# Patient Record
Sex: FEMALE | Race: WHITE | NOT HISPANIC OR LATINO | Employment: FULL TIME | URBAN - METROPOLITAN AREA
[De-identification: names, ages, dates, MRNs, and addresses within clinical notes are randomized per-mention and may not be internally consistent; named-entity substitution may affect disease eponyms.]

---

## 2021-03-28 ENCOUNTER — HOSPITAL ENCOUNTER (INPATIENT)
Facility: HOSPITAL | Age: 56
LOS: 6 days | Discharge: HOME | DRG: 177 | End: 2021-04-03
Attending: EMERGENCY MEDICINE | Admitting: HOSPITALIST
Payer: COMMERCIAL

## 2021-03-28 ENCOUNTER — APPOINTMENT (EMERGENCY)
Dept: RADIOLOGY | Facility: HOSPITAL | Age: 56
DRG: 177 | End: 2021-03-28
Attending: EMERGENCY MEDICINE
Payer: COMMERCIAL

## 2021-03-28 DIAGNOSIS — R09.02 HYPOXIA: Primary | ICD-10-CM

## 2021-03-28 DIAGNOSIS — J18.9 PNEUMONIA OF LEFT LOWER LOBE DUE TO INFECTIOUS ORGANISM: ICD-10-CM

## 2021-03-28 DIAGNOSIS — J12.82 2019 NOVEL CORONAVIRUS-INFECTED PNEUMONIA (NCIP): ICD-10-CM

## 2021-03-28 DIAGNOSIS — U07.1 2019 NOVEL CORONAVIRUS-INFECTED PNEUMONIA (NCIP): ICD-10-CM

## 2021-03-28 PROBLEM — E11.9 TYPE 2 DIABETES MELLITUS WITHOUT COMPLICATION, WITHOUT LONG-TERM CURRENT USE OF INSULIN (CMS/HCC): Status: ACTIVE | Noted: 2021-03-28

## 2021-03-28 PROBLEM — J96.01 ACUTE RESPIRATORY FAILURE WITH HYPOXIA (CMS/HCC): Status: ACTIVE | Noted: 2021-03-28

## 2021-03-28 PROBLEM — I10 BENIGN ESSENTIAL HTN: Status: ACTIVE | Noted: 2021-03-28

## 2021-03-28 PROBLEM — E78.5 HYPERLIPIDEMIA: Status: ACTIVE | Noted: 2021-03-28

## 2021-03-28 LAB
ALBUMIN SERPL-MCNC: 3.6 G/DL (ref 3.4–5)
ALP SERPL-CCNC: 91 IU/L (ref 35–126)
ALT SERPL-CCNC: 44 IU/L (ref 11–54)
ANION GAP SERPL CALC-SCNC: 11 MEQ/L (ref 3–15)
AST SERPL-CCNC: 54 IU/L (ref 15–41)
BACTERIA URNS QL MICRO: ABNORMAL /HPF
BASOPHILS # BLD: 0 K/UL (ref 0.01–0.1)
BASOPHILS NFR BLD: 0 %
BILIRUB SERPL-MCNC: 0.7 MG/DL (ref 0.3–1.2)
BILIRUB UR QL STRIP.AUTO: NEGATIVE MG/DL
BUN SERPL-MCNC: 9 MG/DL (ref 8–20)
CALCIUM SERPL-MCNC: 8.1 MG/DL (ref 8.9–10.3)
CHLORIDE SERPL-SCNC: 101 MEQ/L (ref 98–109)
CLARITY UR REFRACT.AUTO: CLEAR
CO2 SERPL-SCNC: 23 MEQ/L (ref 22–32)
COLOR UR AUTO: YELLOW
CREAT SERPL-MCNC: 0.6 MG/DL (ref 0.6–1.1)
CRP SERPL-MCNC: 48.1 MG/L
DIFFERENTIAL METHOD BLD: ABNORMAL
EOSINOPHIL # BLD: 0 K/UL (ref 0.04–0.36)
EOSINOPHIL NFR BLD: 0 %
ERYTHROCYTE [DISTWIDTH] IN BLOOD BY AUTOMATED COUNT: 13.7 % (ref 11.7–14.4)
FLUAV RNA SPEC QL NAA+PROBE: NEGATIVE
FLUBV RNA SPEC QL NAA+PROBE: NEGATIVE
GFR SERPL CREATININE-BSD FRML MDRD: >60 ML/MIN/1.73M*2
GLUCOSE BLD-MCNC: 112 MG/DL (ref 70–99)
GLUCOSE BLD-MCNC: 207 MG/DL (ref 70–99)
GLUCOSE SERPL-MCNC: 124 MG/DL (ref 70–99)
GLUCOSE UR STRIP.AUTO-MCNC: NEGATIVE MG/DL
HCT VFR BLDCO AUTO: 37.7 % (ref 35–45)
HGB BLD-MCNC: 13.1 G/DL (ref 11.8–15.7)
HGB UR QL STRIP.AUTO: NEGATIVE
HYALINE CASTS #/AREA URNS LPF: ABNORMAL /LPF
IMM GRANULOCYTES # BLD AUTO: 0.02 K/UL (ref 0–0.08)
IMM GRANULOCYTES NFR BLD AUTO: 0.5 %
KETONES UR STRIP.AUTO-MCNC: 1 MG/DL
LDH SERPL L TO P-CCNC: 313 IU/L (ref 98–192)
LEUKOCYTE ESTERASE UR QL STRIP.AUTO: NEGATIVE
LYMPHOCYTES # BLD: 0.85 K/UL (ref 1.2–3.5)
LYMPHOCYTES NFR BLD: 20.5 %
MAGNESIUM SERPL-MCNC: 1.6 MG/DL (ref 1.8–2.5)
MCH RBC QN AUTO: 30.1 PG (ref 28–33.2)
MCHC RBC AUTO-ENTMCNC: 34.7 G/DL (ref 32.2–35.5)
MCV RBC AUTO: 86.7 FL (ref 83–98)
MONOCYTES # BLD: 0.4 K/UL (ref 0.28–0.8)
MONOCYTES NFR BLD: 9.6 %
NEUTROPHILS # BLD: 2.88 K/UL (ref 1.7–7)
NEUTS SEG NFR BLD: 69.4 %
NITRITE UR QL STRIP.AUTO: NEGATIVE
NRBC BLD-RTO: 0 %
PDW BLD AUTO: 10.9 FL (ref 9.4–12.3)
PH UR STRIP.AUTO: 6.5 [PH]
PLATELET # BLD AUTO: 194 K/UL (ref 150–369)
POCT TEST: ABNORMAL
POCT TEST: ABNORMAL
POTASSIUM SERPL-SCNC: 3.3 MEQ/L (ref 3.6–5.1)
PROT SERPL-MCNC: 7.3 G/DL (ref 6–8.2)
PROT UR QL STRIP.AUTO: ABNORMAL
RBC # BLD AUTO: 4.35 M/UL (ref 3.93–5.22)
RBC #/AREA URNS HPF: ABNORMAL /HPF
RSV RNA SPEC QL NAA+PROBE: NEGATIVE
SARS-COV-2 RNA RESP QL NAA+PROBE: POSITIVE
SODIUM SERPL-SCNC: 135 MEQ/L (ref 136–144)
SP GR UR REFRACT.AUTO: 1.02
SQUAMOUS URNS QL MICRO: 1 /HPF
UROBILINOGEN UR STRIP-ACNC: 0.2 EU/DL
WBC # BLD AUTO: 4.15 K/UL (ref 3.8–10.5)
WBC #/AREA URNS HPF: ABNORMAL /HPF

## 2021-03-28 PROCEDURE — 63700000 HC SELF-ADMINISTRABLE DRUG: Performed by: PHYSICIAN ASSISTANT

## 2021-03-28 PROCEDURE — 81001 URINALYSIS AUTO W/SCOPE: CPT | Performed by: PHYSICIAN ASSISTANT

## 2021-03-28 PROCEDURE — 96374 THER/PROPH/DIAG INJ IV PUSH: CPT

## 2021-03-28 PROCEDURE — 86140 C-REACTIVE PROTEIN: CPT | Performed by: HOSPITALIST

## 2021-03-28 PROCEDURE — 87637 SARSCOV2&INF A&B&RSV AMP PRB: CPT | Performed by: PHYSICIAN ASSISTANT

## 2021-03-28 PROCEDURE — 8E0ZXY6 ISOLATION: ICD-10-PCS | Performed by: HOSPITALIST

## 2021-03-28 PROCEDURE — 80053 COMPREHEN METABOLIC PANEL: CPT | Performed by: PHYSICIAN ASSISTANT

## 2021-03-28 PROCEDURE — 99223 1ST HOSP IP/OBS HIGH 75: CPT | Mod: CS | Performed by: HOSPITALIST

## 2021-03-28 PROCEDURE — 83735 ASSAY OF MAGNESIUM: CPT | Performed by: HOSPITALIST

## 2021-03-28 PROCEDURE — 63600000 HC DRUGS/DETAIL CODE: Performed by: HOSPITALIST

## 2021-03-28 PROCEDURE — 20600000 HC ROOM AND CARE INTERMEDIATE/TELEMETRY

## 2021-03-28 PROCEDURE — 85025 COMPLETE CBC W/AUTO DIFF WBC: CPT | Performed by: PHYSICIAN ASSISTANT

## 2021-03-28 PROCEDURE — 83615 LACTATE (LD) (LDH) ENZYME: CPT | Performed by: HOSPITALIST

## 2021-03-28 PROCEDURE — 63700000 HC SELF-ADMINISTRABLE DRUG: Performed by: HOSPITALIST

## 2021-03-28 PROCEDURE — 99284 EMERGENCY DEPT VISIT MOD MDM: CPT | Mod: 25

## 2021-03-28 PROCEDURE — 93005 ELECTROCARDIOGRAM TRACING: CPT | Performed by: PHYSICIAN ASSISTANT

## 2021-03-28 PROCEDURE — 96375 TX/PRO/DX INJ NEW DRUG ADDON: CPT

## 2021-03-28 PROCEDURE — 36415 COLL VENOUS BLD VENIPUNCTURE: CPT | Performed by: PHYSICIAN ASSISTANT

## 2021-03-28 PROCEDURE — 25800000 HC PHARMACY IV SOLUTIONS: Performed by: PHYSICIAN ASSISTANT

## 2021-03-28 PROCEDURE — 71045 X-RAY EXAM CHEST 1 VIEW: CPT

## 2021-03-28 PROCEDURE — 63600000 HC DRUGS/DETAIL CODE: Performed by: PHYSICIAN ASSISTANT

## 2021-03-28 RX ORDER — POTASSIUM CHLORIDE 20 MEQ/1
20 TABLET, EXTENDED RELEASE ORAL ONCE
Status: COMPLETED | OUTPATIENT
Start: 2021-03-28 | End: 2021-03-28

## 2021-03-28 RX ORDER — ALBUTEROL SULFATE 90 UG/1
2 INHALANT RESPIRATORY (INHALATION) EVERY 6 HOURS PRN
Status: DISCONTINUED | OUTPATIENT
Start: 2021-03-28 | End: 2021-03-28 | Stop reason: SDUPTHER

## 2021-03-28 RX ORDER — METOPROLOL SUCCINATE 50 MG/1
50 TABLET, EXTENDED RELEASE ORAL DAILY
Status: DISCONTINUED | OUTPATIENT
Start: 2021-03-28 | End: 2021-04-03 | Stop reason: HOSPADM

## 2021-03-28 RX ORDER — DEXTROSE 40 %
15-30 GEL (GRAM) ORAL AS NEEDED
Status: DISCONTINUED | OUTPATIENT
Start: 2021-03-28 | End: 2021-04-03 | Stop reason: HOSPADM

## 2021-03-28 RX ORDER — DEXTROSE 50 % IN WATER (D50W) INTRAVENOUS SYRINGE
25 AS NEEDED
Status: DISCONTINUED | OUTPATIENT
Start: 2021-03-28 | End: 2021-03-28 | Stop reason: SDUPTHER

## 2021-03-28 RX ORDER — ENOXAPARIN SODIUM 100 MG/ML
40 INJECTION SUBCUTANEOUS
Status: DISCONTINUED | OUTPATIENT
Start: 2021-03-28 | End: 2021-04-03 | Stop reason: HOSPADM

## 2021-03-28 RX ORDER — BENZONATATE 100 MG/1
200 CAPSULE ORAL 3 TIMES DAILY PRN
Status: DISCONTINUED | OUTPATIENT
Start: 2021-03-28 | End: 2021-04-03 | Stop reason: HOSPADM

## 2021-03-28 RX ORDER — DEXAMETHASONE SODIUM PHOSPHATE 4 MG/ML
6 INJECTION, SOLUTION INTRA-ARTICULAR; INTRALESIONAL; INTRAMUSCULAR; INTRAVENOUS; SOFT TISSUE ONCE
Status: COMPLETED | OUTPATIENT
Start: 2021-03-28 | End: 2021-03-28

## 2021-03-28 RX ORDER — SODIUM CHLORIDE 9 MG/ML
125 INJECTION, SOLUTION INTRAVENOUS CONTINUOUS
Status: DISCONTINUED | OUTPATIENT
Start: 2021-03-28 | End: 2021-03-28

## 2021-03-28 RX ORDER — ALBUTEROL SULFATE 90 UG/1
4 INHALANT RESPIRATORY (INHALATION) ONCE
Status: COMPLETED | OUTPATIENT
Start: 2021-03-28 | End: 2021-03-28

## 2021-03-28 RX ORDER — ONDANSETRON HYDROCHLORIDE 2 MG/ML
4 INJECTION, SOLUTION INTRAVENOUS ONCE
Status: COMPLETED | OUTPATIENT
Start: 2021-03-28 | End: 2021-03-28

## 2021-03-28 RX ORDER — DEXAMETHASONE SODIUM PHOSPHATE 4 MG/ML
6 INJECTION, SOLUTION INTRA-ARTICULAR; INTRALESIONAL; INTRAMUSCULAR; INTRAVENOUS; SOFT TISSUE
Status: DISCONTINUED | OUTPATIENT
Start: 2021-03-29 | End: 2021-04-03 | Stop reason: HOSPADM

## 2021-03-28 RX ORDER — IBUPROFEN 200 MG
16-32 TABLET ORAL AS NEEDED
Status: DISCONTINUED | OUTPATIENT
Start: 2021-03-28 | End: 2021-04-03 | Stop reason: HOSPADM

## 2021-03-28 RX ORDER — DEXTROSE 40 %
15-30 GEL (GRAM) ORAL AS NEEDED
Status: DISCONTINUED | OUTPATIENT
Start: 2021-03-28 | End: 2021-03-28 | Stop reason: SDUPTHER

## 2021-03-28 RX ORDER — IBUPROFEN 200 MG
16-32 TABLET ORAL AS NEEDED
Status: DISCONTINUED | OUTPATIENT
Start: 2021-03-28 | End: 2021-03-28 | Stop reason: SDUPTHER

## 2021-03-28 RX ORDER — ALBUTEROL SULFATE 90 UG/1
2 INHALANT RESPIRATORY (INHALATION) EVERY 6 HOURS PRN
Status: DISCONTINUED | OUTPATIENT
Start: 2021-03-28 | End: 2021-04-03 | Stop reason: HOSPADM

## 2021-03-28 RX ORDER — INSULIN ASPART 100 [IU]/ML
3-9 INJECTION, SOLUTION INTRAVENOUS; SUBCUTANEOUS
Status: DISCONTINUED | OUTPATIENT
Start: 2021-03-28 | End: 2021-04-03 | Stop reason: HOSPADM

## 2021-03-28 RX ORDER — ACETAMINOPHEN 325 MG/1
650 TABLET ORAL ONCE
Status: COMPLETED | OUTPATIENT
Start: 2021-03-28 | End: 2021-03-28

## 2021-03-28 RX ORDER — DEXTROSE 50 % IN WATER (D50W) INTRAVENOUS SYRINGE
25 AS NEEDED
Status: DISCONTINUED | OUTPATIENT
Start: 2021-03-28 | End: 2021-04-03 | Stop reason: HOSPADM

## 2021-03-28 RX ADMIN — DEXAMETHASONE SODIUM PHOSPHATE 6 MG: 4 INJECTION, SOLUTION INTRA-ARTICULAR; INTRALESIONAL; INTRAMUSCULAR; INTRAVENOUS; SOFT TISSUE at 10:07

## 2021-03-28 RX ADMIN — BENZONATATE 200 MG: 100 CAPSULE ORAL at 22:25

## 2021-03-28 RX ADMIN — METOPROLOL SUCCINATE 50 MG: 50 TABLET, EXTENDED RELEASE ORAL at 17:20

## 2021-03-28 RX ADMIN — ACETAMINOPHEN 650 MG: 325 TABLET ORAL at 09:55

## 2021-03-28 RX ADMIN — INSULIN ASPART 3 UNITS: 100 INJECTION, SOLUTION INTRAVENOUS; SUBCUTANEOUS at 17:28

## 2021-03-28 RX ADMIN — ALBUTEROL SULFATE 4 PUFF: 90 AEROSOL, METERED RESPIRATORY (INHALATION) at 09:55

## 2021-03-28 RX ADMIN — POTASSIUM CHLORIDE 20 MEQ: 1500 TABLET, EXTENDED RELEASE ORAL at 11:42

## 2021-03-28 RX ADMIN — SODIUM CHLORIDE 125 ML/HR: 9 INJECTION, SOLUTION INTRAVENOUS at 10:08

## 2021-03-28 RX ADMIN — ENOXAPARIN SODIUM 40 MG: 40 INJECTION SUBCUTANEOUS at 17:20

## 2021-03-28 RX ADMIN — ONDANSETRON 4 MG: 2 INJECTION INTRAMUSCULAR; INTRAVENOUS at 10:07

## 2021-03-28 SDOH — HEALTH STABILITY: MENTAL HEALTH: HOW OFTEN DO YOU HAVE A DRINK CONTAINING ALCOHOL?: NEVER

## 2021-03-28 ASSESSMENT — ENCOUNTER SYMPTOMS
COLOR CHANGE: 0
TROUBLE SWALLOWING: 0
COUGH: 1
MYALGIAS: 1
CHEST TIGHTNESS: 1
HEMATURIA: 0
DIZZINESS: 0
NAUSEA: 1
FLANK PAIN: 0
VOICE CHANGE: 0
NECK PAIN: 0
ABDOMINAL PAIN: 0
TREMORS: 0
CONSTIPATION: 0
ARTHRALGIAS: 0
DIARRHEA: 1
CONFUSION: 0
FREQUENCY: 0
NUMBNESS: 0
DIFFICULTY URINATING: 0
HEADACHES: 0
DECREASED CONCENTRATION: 0
STRIDOR: 0
FATIGUE: 1
SINUS PAIN: 0
DYSURIA: 0
BACK PAIN: 0
PHOTOPHOBIA: 0
RHINORRHEA: 0
SHORTNESS OF BREATH: 1
BRUISES/BLEEDS EASILY: 0
SORE THROAT: 0
LIGHT-HEADEDNESS: 0
DIAPHORESIS: 0
CHILLS: 0
WHEEZING: 0
JOINT SWELLING: 0
PALPITATIONS: 0
VOMITING: 1
WEAKNESS: 1
SINUS PRESSURE: 0
FEVER: 1
NECK STIFFNESS: 0

## 2021-03-28 NOTE — ED PROVIDER NOTES
"Emergency Medicine Note  HPI   HISTORY OF PRESENT ILLNESS     Patient is a 57 y/o female with PMH HTN, HLD, DM type II (NIDDM) who presents c/o COVID symptoms x 8 days  States symptoms began on 3/20 with fever  States she was tested on 3/21 with a self administered test at [facility] and it was negative   States over the course of the week she has progressively worsened   Notes she did call her PCP, they did a telemedicine visit and rx z-pack which she began on 3/25, states she has 1 pill left  Reports cough, congestion, fever, bodyaches, n/v/d   States cough is dry, constant - notes associated SOB   States TNTC diarrhea - without blood or mucus   States emesis occasionally is with mucus - denies blood   States last evening \"I just couldn't take it anymore\" which prompted her arrival to ED this morning   Denies chest pain, abdominal pain   Denies  symptoms  Denies any other complaints or concerns at this time             Patient History   PAST HISTORY     Reviewed from Nursing Triage:      Past Medical History:   Diagnosis Date   • Hypertension    • Lipid disorder    • Type 2 diabetes mellitus (CMS/HCC)        History reviewed. No pertinent surgical history.    History reviewed. No pertinent family history.    Social History     Tobacco Use   • Smoking status: Never Smoker   • Smokeless tobacco: Never Used   Substance Use Topics   • Alcohol use: Never     Frequency: Never   • Drug use: Never         Review of Systems   REVIEW OF SYSTEMS     Review of Systems   Constitutional: Positive for fatigue and fever. Negative for chills and diaphoresis.   HENT: Positive for congestion. Negative for ear pain, postnasal drip, rhinorrhea, sinus pressure, sinus pain, sore throat, tinnitus, trouble swallowing and voice change.    Eyes: Negative for photophobia and visual disturbance.   Respiratory: Positive for cough, chest tightness and shortness of breath. Negative for wheezing and stridor.    Cardiovascular: Negative for chest pain, " palpitations and leg swelling.   Gastrointestinal: Positive for diarrhea, nausea and vomiting. Negative for abdominal pain and constipation.   Genitourinary: Negative for decreased urine volume, difficulty urinating, dysuria, flank pain, frequency, hematuria, pelvic pain and urgency.   Musculoskeletal: Positive for myalgias. Negative for arthralgias, back pain, gait problem, joint swelling, neck pain and neck stiffness.   Skin: Negative for color change and rash.   Neurological: Positive for weakness. Negative for dizziness, tremors, syncope, light-headedness, numbness and headaches.   Hematological: Does not bruise/bleed easily.   Psychiatric/Behavioral: Negative for confusion and decreased concentration.         VITALS     ED Vitals    Date/Time Temp Pulse Resp BP SpO2 [facility]   03/28/21 1101 -- -- 22 136/67 95 % SFC   03/28/21 0924 37.5 °C (99.5 °F) -- 22 141/78 87 % CFP        Pulse Ox %: 87 % (03/28/21 0955)  Pulse Ox Interpretation: Low (03/28/21 0955)           Physical Exam   PHYSICAL EXAM     Physical Exam  Vitals signs and nursing note reviewed.   Constitutional:       General: She is not in acute distress.     Appearance: Normal appearance. She is ill-appearing. She is not toxic-appearing or diaphoretic.   HENT:      Head: Normocephalic and atraumatic.   Eyes:      Extraocular Movements: Extraocular movements intact.      Conjunctiva/sclera: Conjunctivae normal.      Pupils: Pupils are equal, round, and reactive to light.   Neck:      Musculoskeletal: Normal range of motion and neck supple.   Cardiovascular:      Rate and Rhythm: Normal rate and regular rhythm.      Pulses: Normal pulses.      Heart sounds: Normal heart sounds.   Pulmonary:      Effort: Pulmonary effort is normal. No respiratory distress.      Breath sounds: Normal breath sounds. No stridor. No wheezing.   Chest:      Chest wall: No tenderness.   Abdominal:      General: Bowel sounds are normal. There is no distension.      Palpations:  Abdomen is soft.      Tenderness: There is no abdominal tenderness. There is no guarding or rebound.   Musculoskeletal: Normal range of motion.   Skin:     General: Skin is warm and dry.      Capillary Refill: Capillary refill takes less than 2 seconds.   Neurological:      General: No focal deficit present.      Mental Status: She is alert and oriented to person, place, and time.   Psychiatric:         Mood and Affect: Mood normal.         Thought Content: Thought content normal.         Judgment: Judgment normal.           PROCEDURES     Procedures     DATA     Results     Procedure Component Value Units Date/Time    UA with reflex culture [[id number]] Collected: 03/28/21 1102    Specimen: Urine, Clean Catch Updated: 03/28/21 1107    Narrative:      The following orders were created for panel order UA with reflex culture.  Procedure                               Abnormality         Status                     ---------                               -----------         ------                     UA Reflex to Culture (Ma...[[id number]]                      In process                   Please view results for these tests on the individual orders.    UA Reflex to Culture (Macroscopic) [[id number]] Collected: 03/28/21 1102    Specimen: Urine, Clean Catch Updated: 03/28/21 1107    SARS-CoV-2 (COVID-19), PCR Nasopharynx [[id number]]  (Abnormal) Collected: 03/28/21 1005    Specimen: Nasopharyngeal Swab from Nasopharynx Updated: 03/28/21 1104    Narrative:      The following orders were created for panel order SARS-CoV-2 (COVID-19), PCR Nasopharynx.  Procedure                               Abnormality         Status                     ---------                               -----------         ------                     SARS-COV-2 (COVID-19)/ F...[[id number]]  Abnormal            Final result                 Please view results for these tests on the individual orders.    SARS-COV-2 (COVID-19)/ FLU A/B, AND RSV, PCR Nasopharynx  [[id number]]  (Abnormal) Collected: 03/28/21 1005    Specimen: Nasopharyngeal Swab from Nasopharynx Updated: 03/28/21 1104     SARS-CoV-2 (COVID-19) Positive     Influenza A Negative     Influenza B Negative     Respiratory Syncytial Virus Negative    Comprehensive metabolic panel [[id number]]  (Abnormal) Collected: 03/28/21 1005    Specimen: Blood, Venous Updated: 03/28/21 1043     Sodium 135 mEQ/L      Potassium 3.3 mEQ/L      Comment: Results obtained on plasma. Plasma Potassium values may be up to 0.4 mEQ/L less than serum values. The differences may be greater for patients with high platelet or white cell counts.        Chloride 101 mEQ/L      CO2 23 mEQ/L      BUN 9 mg/dL      Creatinine 0.6 mg/dL      Glucose 124 mg/dL      Calcium 8.1 mg/dL      AST (SGOT) 54 IU/L      ALT (SGPT) 44 IU/L      Alkaline Phosphatase 91 IU/L      Total Protein 7.3 g/dL      Comment: Test performed on plasma which typically contains approximately 0.4 g/dL more protein than serum.        Albumin 3.6 g/dL      Bilirubin, Total 0.7 mg/dL      eGFR >60.0 mL/min/1.73m*2      Anion Gap 11 mEQ/L     CBC and differential [[id number]]  (Abnormal) Collected: 03/28/21 1005    Specimen: Blood, Venous Updated: 03/28/21 1020     WBC 4.15 K/uL      RBC 4.35 M/uL      Hemoglobin 13.1 g/dL      Hematocrit 37.7 %      MCV 86.7 fL      MCH 30.1 pg      MCHC 34.7 g/dL      RDW 13.7 %      Platelets 194 K/uL      MPV 10.9 fL      Differential Type Auto     nRBC 0.0 %      Immature Granulocytes 0.5 %      Neutrophils 69.4 %      Lymphocytes 20.5 %      Monocytes 9.6 %      Eosinophils 0.0 %      Basophils 0.0 %      Immature Granulocytes, Absolute 0.02 K/uL      Neutrophils, Absolute 2.88 K/uL      Lymphocytes, Absolute 0.85 K/uL      Monocytes, Absolute 0.40 K/uL      Eosinophils, Absolute 0.00 K/uL      Basophils, Absolute 0.00 K/uL           Imaging Results          X-RAY CHEST 1 VIEW (Final result)  Result time 03/28/21 10:17:44    Final result                  Impression:    IMPRESSION:    The lungs are hypoinflated.  The superior mediastinum and cardiac silhouette are  magnified due to technique.  There is consolidative opacity within the mid and  left lower lung with air bronchograms, in keeping with a pneumonia.  No  pneumothorax or vascular congestion.  PA and lateral radiographs are recommended  in 6-8 weeks to document resolution of imaging findings.             Narrative:      CLINICAL HISTORY: Fever    COMMENT: Single view chest                                ECG 12 lead    (Results Pending)       Scoring tools                               ED Course & MDM   MDM / ED COURSE and CLINICAL IMPRESSIONS     MDM    ED Course as of Mar 28 1122   Sun Mar 28, 2021   1104 SARS-CoV-2 (COVID-19)(!!): Positive [CL]   1105 CXR reviewed     [CL]   1105 Plan to admit, COVID PNA and hypoxia    [CL]   1111 [place] [facility] [place]   1111 Patient agreeable with plan for admission     [CL]   1121 Case was discussed with hospitalist.  Reviewed patient's presentation, ED course, and relevant data.  Hospitalist accepts patient on their service and will see / admit pt.    [CL]      ED Course User Index  [CL] Buffy Maldonado PA C         Clinical Impressions as of Mar 28 1122   Hypoxia   Pneumonia of left lower lobe due to infectious organism   2019 novel coronavirus-infected pneumonia ([facility])            Buffy Maldonado PA C  03/28/21 1122

## 2021-03-28 NOTE — ASSESSMENT & PLAN NOTE
+ COVID testing with some imaging changes on CXR  Required 4L nc now improved to RA  Decadron started in the ED  ID following; completed remdesivir  Continue supportive care  Wean O2 as tolerated - passed home O2 evaluation.   Much improved clinically.  Ms. Lane feels much better. She is ambulating and talkative and is in good spirits. Afebrile.   Oxygenation stable on RA ambulation.  She would like to be discharged home. She has a pulse ox at home and will continue to monitor herself closely and will follow up with her PCP.

## 2021-03-28 NOTE — PLAN OF CARE
Problem: Adult Inpatient Plan of Care  Goal: Plan of Care Review  Outcome: Progressing  Flowsheets (Taken 3/28/2021 0347)  Plan of Care Reviewed With: patient  Outcome Summary: discussed the plan of care  Goal: Readiness for Transition of Care  Intervention: Mutually Develop Transition Plan  Flowsheets (Taken 3/28/2021 4750)  Anticipated Discharge Disposition: home without services  Equipment Needed After Discharge: none  Discharge Coordination/Progress:   Resides in a private home with spouse  Independent and ambulatory  PCP is Suzie Lewis           Assistive Device/Animal Currently Used at Home: none  Anticipated Changes Related to Illness: none  Transportation Concerns: car, none  Readmission Within the Last 30 Days: no previous admission in last 30 days  Patient/Family Anticipated Services at Transition: none  Patient/Family Anticipates Transition to: home with family  Transportation Anticipated: family or friend will provide  Concerns to be Addressed:   discharge planning   no discharge needs identified   denies needs/concerns at this time  Patient's Choice of Community Agency(s): N/A  Offered/Gave Vendor List: no  Explained the role of the CC team, will follow and assist as necessary.

## 2021-03-28 NOTE — ED ATTESTATION NOTE
The patient was evaluated and managed by the physician assistant / nurse practitioner.     Kenzie Wakler DO  03/28/21 1147

## 2021-03-28 NOTE — H&P
Hospital Medicine Service -  History & Physical        CHIEF COMPLAINT   Shortness of breath     HISTORY OF PRESENT ILLNESS      Telma Lane is a 56 y.o. female with a past medical history of HTN, HLD, DM2 who presents with fever and shortness of breath. Patient developed fever 3/21/21 of 101 at home. She took a self-administered [facility] test the next day and it was negative.  She continued to have fever, non-productive cough, malaise, body aches and had some GI symptoms - with diarrhea during that time. No nausea or vomiting. Saw PCP on virtual visit 3/25 and was given azithromycin. Has almost completed the course of antibiotics but has not felt better. She exerted herself yesterday by helping her daughter move. Felt extremely tired this morning with shortness of breath and cough. She subsequently presented to the ER for evaluation.    Was 87% on RA in the ER.  [place] better after steroids and oxygen.    Patient does work as a school lunch .    PAST MEDICAL AND SURGICAL HISTORY      Past Medical History:   Diagnosis Date   • Hypertension    • Lipid disorder    • Type 2 diabetes mellitus (CMS/[facility])        History reviewed. No pertinent surgical history.    PCP: Suzie Burrell CRNP    MEDICATIONS      Prior to Admission medications    Not on File       ALLERGIES      Penicillins and Tetracycline    FAMILY HISTORY      History reviewed. No pertinent family history.    SOCIAL HISTORY      Social History     Socioeconomic History   • Marital status:      Spouse name: None   • Number of children: None   • Years of education: None   • Highest education level: None   Occupational History   • None   Social Needs   • Financial resource strain: None   • Food insecurity     Worry: None     Inability: None   • Transportation needs     Medical: None     Non-medical: None   Tobacco Use   • Smoking status: Never Smoker   • Smokeless tobacco: Never Used   Substance and Sexual Activity   • Alcohol use: Never      Frequency: Never   • Drug use: Never   • Sexual activity: None   Lifestyle   • Physical activity     Days per week: None     Minutes per session: None   • Stress: None   Relationships   • Social connections     Talks on phone: None     Gets together: None     Attends Congregational service: None     Active member of club or organization: None     Attends meetings of clubs or organizations: None     Relationship status: None   • Intimate partner violence     Fear of current or ex partner: None     Emotionally abused: None     Physically abused: None     Forced sexual activity: None   Other Topics Concern   • None   Social History Narrative   • None       REVIEW OF SYSTEMS      All other systems reviewed and negative except as noted in HPI    PHYSICAL EXAMINATION      Temp:  [37.4 °C (99.4 °F)-37.8 °C (100.1 °F)] 37.4 °C (99.4 °F)  Heart Rate:  [92] 92  Resp:  [18-22] 18  BP: (127-141)/(64-82) 132/82  Body mass index is 36.43 kg/m².    Physical Exam  Constitutional:       Appearance: She is well-developed. She is obese.   HENT:      Head: Normocephalic and atraumatic.   Eyes:      General: No scleral icterus.     Pupils: Pupils are equal, round, and reactive to light.   Neck:      Musculoskeletal: Normal range of motion and neck supple.      Vascular: No JVD.   Cardiovascular:      Rate and Rhythm: Normal rate.   Pulmonary:      Effort: Pulmonary effort is normal. No respiratory distress.      Breath sounds: No stridor. No wheezing or rales.      Comments: Diminished breath sounds at the bases  Chest:      Chest wall: No tenderness.   Abdominal:      General: Bowel sounds are normal. There is no distension.      Palpations: Abdomen is soft.      Tenderness: There is no abdominal tenderness. There is no guarding or rebound.   Musculoskeletal: Normal range of motion.         General: No tenderness or deformity.   Lymphadenopathy:      Cervical: No cervical adenopathy.   Skin:     General: Skin is warm and dry.    Neurological:      Mental Status: She is alert and oriented to person, place, and time.      Sensory: No sensory deficit.      Motor: No abnormal muscle tone.   Psychiatric:         Behavior: Behavior normal.         LABS / IMAGING / EKG        Labs  I have reviewed the patient's pertinent labs.  Significant abnormals are Cr 0.6, Cr 3.3, WBC 4.15, Hgb 13.1.    Imaging  CXR with hypoinflated lungs. Consolidation opacity with mid and LLL.    SARS-CoV-2 (COVID-19) (no units)   Date/Time Value   03/28/2021 1005 Positive (AA)       ECG/Telemetry  I have independently reviewed the telemetry. Significant findings include normal sinus.    ASSESSMENT AND PLAN           * Pneumonia due to COVID-19 virus  Assessment & Plan  + COVID testing with some imaging changes on CXR  Now with mild hypoxia requiring low dose oxygen supplementation  Got decadron in the ER, continue daily for now  ID to assess for remdesivir  Follow inflammatory markers    Acute respiratory failure with hypoxia (CMS/HCC)  Assessment & Plan  2/2 COVID PNA  Cont supplemental oxygen, wean as able  Home O2 eval prior to discharge    Type 2 diabetes mellitus without complication, without long-term current use of insulin (CMS/HCC)  Assessment & Plan  Usually on metformin at home  Hold while inpatient  Accucheck and SSI    Hyperlipidemia  Assessment & Plan  Atorvastatin    Benign essential HTN  Assessment & Plan  Usually on metoprolol, which she was not able to take this AM  Continue metoprolol and trend BPs  Titrate as needed       VTE Assessment: [place]    VTE Prophylaxis: Current anticoagulant orders:      enoxaparin (LOVENOX) syringe 40 mg  Daily (6p)     Code Status: Full Code  Estimated Discharge Date: 3/30/2021  Disposition Planning: pending clinical course     Jonna Quintana MD  3/28/2021

## 2021-03-28 NOTE — ASSESSMENT & PLAN NOTE
Usually on metformin at home  Hold while inpatient  Accucheck and SSI  Resume home med regimen on discharge.

## 2021-03-28 NOTE — CONSULTS
Infectious Disease Consult Note    Patient Name: Telma Lane  MR#: [id number]  : 1965  Admission Date: 3/28/2021  Consult Date: 21 12:15 PM   Consultant: Keysha Taylor MD    Reason for Consult: COVID pneumonia  Referring Provider:     History of Present Illness     Telma Lane is a 56 y.o. female who was admitted on 3/28/2021. For diarrhea, feeling unwell, weak.  She developed a fever last Saturday night and then went to [facility] the following  which was a week ago for a self-administered test which was negative but during the week she continued to have diarrhea, fever, weakness, had a televisit with her primary care physician who prescribed the Z-Jeff on 3/25/2021.  She states that her daughter bought a new house and she was helping her almost all day yesterday and thinks that she overdid it and felt extremely drained which prompted the arrival to the ER where she was noted to have a pulse ox of 87% on room air which improved to 95% with 2 L oxygen supplementation  Denies significant cough, mild in nature, clear white sputum  No nausea no vomiting  Last BM yesterday  Denies smoking  States that she has been awake modified proning at home including on her stomach as she had heard about that on TV in terms of the fact  Lives at home with her  and son and daughter.  Both her son and daughter are Covid vaccinated as they are in healthcare    Outside records were reviewed.    Allergies:   Allergies   Allergen Reactions   • Penicillins    • Tetracycline        Medical History:   Past Medical History:   Diagnosis Date   • Hypertension    • Lipid disorder    • Type 2 diabetes mellitus (CMS/[facility])        Surgical History: History reviewed. No pertinent surgical history.    Social History:   Social History     Socioeconomic History   • Marital status:      Spouse name: None   • Number of children: None   • Years of education: None   • Highest education level: None   Occupational  History   • None   Social Needs   • Financial resource strain: None   • Food insecurity     Worry: None     Inability: None   • Transportation needs     Medical: None     Non-medical: None   Tobacco Use   • Smoking status: Never Smoker   • Smokeless tobacco: Never Used   Substance and Sexual Activity   • Alcohol use: Never     Frequency: Never   • Drug use: Never   • Sexual activity: None   Lifestyle   • Physical activity     Days per week: None     Minutes per session: None   • Stress: None   Relationships   • Social connections     Talks on phone: None     Gets together: None     Attends [employer] service: None     Active member of club or organization: None     Attends meetings of clubs or organizations: None     Relationship status: None   • Intimate partner violence     Fear of current or ex partner: None     Emotionally abused: None     Physically abused: None     Forced sexual activity: None   Other Topics Concern   • None   Social History Narrative   • None          Travel Exposure:   Travel and Exposure Screening      Most Recent Value   Travel Screening   Overnight hospitalization outside the [place] in the last year?  No Filed On: 03/28/2021 0929   Exposure Screening   Symptoms          Animal Exposure: none    Other Exposure: none    Family History: History reviewed. No pertinent family history.    Review of Systems    Constitutional: positive for sweats, malaise, fatigue and chills  Eyes: negative for redness  Ears, nose, mouth, throat, and face: negative for sore throat  Respiratory: negative for asthma, chronic bronchitis, hemoptysis, pleurisy/chest pain and wheezing  Cardiovascular: negative for chest pressure/discomfort  Gastrointestinal: positive for diarrhea  Genitourinary:negative for dysuria  Integument/breast: negative for skin color change  Hematologic/lymphatic: negative for bleeding  Musculoskeletal:negative for myalgias  Neurological: negative for headaches    Medications:    Current IP Meds  "(From admission, onward)        Frequency     potassium chloride (KLOR-CON) tablet extended release 20 mEq  ([facility] ED MED QUICK LIST ELECTROLYTE REP)      Once     acetaminophen (TYLENOL) tablet 650 mg      Once     ondansetron (ZOFRAN) injection 4 mg      Once     sodium chloride 0.9 % infusion      Continuous     dexamethasone (DECADRON) injection 6 mg      Once     albuterol HFA (VENTOLIN HFA) 90 mcg/actuation inhaler 4 puff  (Albuterol MDI Subpanel)      Once                Objective     Vital Signs:    Patient Vitals for the past 72 hrs:   BP Temp Temp src Resp SpO2 Height Weight   21 1155 132/66 37.8 °C (100.1 °F) Oral 18 93 % -- --   21 1101 136/67 -- -- (!) 22 95 % -- --   21 0924 (!) 141/78 37.5 °C (99.5 °F) Oral (!) 22 (!) 87 % 1.676 m (5' 6\") 108 kg (238 lb)       Temp (72hrs), Av.7 °C (99.8 °F), Min:37.5 °C (99.5 °F), Max:37.8 °C (100.1 °F)      Physical Exam:    Awake, alert, anicteric, normocephalic, EOMI, does not appear to be in acute distress, comfortable on oxygen supplementation by nasal cannula 2 L, S1-S2, nonlabored respiration, abdomen is soft, oriented to time place and person, cooperative    Lines, Drains, Airways, Wounds:       Labs:    CBC Results       21                          1005           WBC 4.15           RBC 4.35           HGB 13.1           HCT 37.7           MCV 86.7           MCH 30.1           MCHC 34.7                                  BMP Results       21                          1005                      K 3.3           Cl 101           CO2 23           Glucose 124           BUN 9           Creatinine 0.6           Calcium 8.1           Anion Gap 11           EGFR >60.0           Comment for K at 1005 on 21: Results obtained on plasma. Plasma Potassium values may be up to 0.4 mEQ/L less than serum values. The differences may be greater for patients with high platelet or white cell counts.      PT/PTT Results     No lab " values to display.      UA Results       03/28/21                          1102           Color Yellow           Clarity Clear           Glucose Negative           Bilirubin Negative           Ketones +1           Sp Grav 1.020           Blood Negative           Ph 6.5           Protein Trace           Urobilinogen 0.2           Nitrite Negative           Leuk Est Negative           WBC 0 TO 3           RBC 0 TO 4           Bacteria None Seen           Comment for Ketones at 1102 on 03/28/21: Free sulfhydryl drugs such as Mesna, Capoten, and Acetylcysteine (Mucomyst) may cause false positive ketonuria.    Comment for Blood at 1102 on 03/28/21: The sensitivity of the occult blood test is equivalent to approximately 4 intact RBC/HPF.    Comment for Protein at 1102 on 03/28/21: Trace False Positive Protein can be seen with alkaline or highly buffered urines or urine with high specific gravity. Suggest clinical correlation.    Comment for Leuk Est at 1102 on 03/28/21: Results can be falsely negative due to high specific gravity, some antibiotics, glucose >3 g/dl, or WBC other than neutrophils.      Lactate Results     No lab values to display.          Microbiology Results     Procedure Component Value Units Date/Time    SARS-CoV-2 (COVID-19), PCR Nasopharynx [[id number]]  (Abnormal) Collected: 03/28/21 1005    Specimen: Nasopharyngeal Swab from Nasopharynx Updated: 03/28/21 1104    Narrative:      The following orders were created for panel order SARS-CoV-2 (COVID-19), PCR Nasopharynx.  Procedure                               Abnormality         Status                     ---------                               -----------         ------                     SARS-COV-2 (COVID-19)/ F...[[id number]]  Abnormal            Final result                 Please view results for these tests on the individual orders.    SARS-COV-2 (COVID-19)/ FLU A/B, AND RSV, PCR Nasopharynx [[id number]]  (Abnormal) Collected: 03/28/21 1005     Specimen: Nasopharyngeal Swab from Nasopharynx Updated: 03/28/21 1104     SARS-CoV-2 (COVID-19) Positive     Influenza A Negative     Influenza B Negative     Respiratory Syncytial Virus Negative          Pathology Results     ** No results found for the last 720 hours. **          Echo:          Imaging:    Radiology Imaging   XR CHEST 1 VW    Narrative CLINICAL HISTORY: Fever    COMMENT: Single view chest      Impression IMPRESSION:    The lungs are hypoinflated.  The superior mediastinum and cardiac silhouette are  magnified due to technique.  There is consolidative opacity within the mid and  left lower lung with air bronchograms, in keeping with a pneumonia.  No  pneumothorax or vascular congestion.  PA and lateral radiographs are recommended  in 6-8 weeks to document resolution of imaging findings.       Assessment and plan    COVID-19  -Possible COVID-19 pneumonia, consolidative opacity on the left side on the x-ray  -Has been on a Z-Jeff, has completed a 4-day course  -Denies any purulent sputum or significant sputum production  -Associated acute hypoxic respiratory Insufficiency, presently oxygenating well on 2 L oxygen supplementation  -T-max 100.1  -Creatinine 0.6, creatinine clearance 131.8  -AST/ALT 54/44  -WBC 4.15  -Encourage awake modified proning and incentive spirometry as tolerated  -Isolation precautions per protocol  -On intravenous Decadron per primary team, received 6 mg Decadron dose at around 10 AM this morning.  If she continues to oxygenate well on 2 L then no need to add further therapeutics from an infectious disease perspective for COVID-19, on the other hand if requires more than 2 L then patient is agreeable to addition of remdesivir.  Her renal function and liver function are both normal therefore would be okay to add the remdesivir if she requires more oxygen.  If that happens later in the day or overnight please initiate the patient on the remdesivir as noted above.  We will  reevaluate again tomorrow as well.  -Penicillin allergy listed but states that she has taken amoxicillin for ear infections several times without any reactions or rash    NOTE: Some or all of the note above was created with the use of dictation software, please note this dictation software can have anomalies in its ability to transcribe. Please contact me directly for anything that seems abnormal for clarification.

## 2021-03-29 LAB
ALBUMIN SERPL-MCNC: 3.6 G/DL (ref 3.4–5)
ALP SERPL-CCNC: 93 IU/L (ref 35–126)
ALT SERPL-CCNC: 40 IU/L (ref 11–54)
ANION GAP SERPL CALC-SCNC: 12 MEQ/L (ref 3–15)
AST SERPL-CCNC: 58 IU/L (ref 15–41)
ATRIAL RATE: 91
BASOPHILS # BLD: 0 K/UL (ref 0.01–0.1)
BASOPHILS NFR BLD: 0 %
BILIRUB SERPL-MCNC: 0.7 MG/DL (ref 0.3–1.2)
BUN SERPL-MCNC: 9 MG/DL (ref 8–20)
CALCIUM SERPL-MCNC: 8.5 MG/DL (ref 8.9–10.3)
CHLORIDE SERPL-SCNC: 100 MEQ/L (ref 98–109)
CO2 SERPL-SCNC: 22 MEQ/L (ref 22–32)
CREAT SERPL-MCNC: 0.7 MG/DL (ref 0.6–1.1)
DIFFERENTIAL METHOD BLD: ABNORMAL
EOSINOPHIL # BLD: 0 K/UL (ref 0.04–0.36)
EOSINOPHIL NFR BLD: 0 %
ERYTHROCYTE [DISTWIDTH] IN BLOOD BY AUTOMATED COUNT: 13.9 % (ref 11.7–14.4)
GFR SERPL CREATININE-BSD FRML MDRD: >60 ML/MIN/1.73M*2
GLUCOSE BLD-MCNC: 106 MG/DL (ref 70–99)
GLUCOSE BLD-MCNC: 108 MG/DL (ref 70–99)
GLUCOSE BLD-MCNC: 185 MG/DL (ref 70–99)
GLUCOSE BLD-MCNC: 198 MG/DL (ref 70–99)
GLUCOSE SERPL-MCNC: 113 MG/DL (ref 70–99)
HCT VFR BLDCO AUTO: 46.8 % (ref 35–45)
HGB BLD-MCNC: 14.9 G/DL (ref 11.8–15.7)
LYMPHOCYTES # BLD: 1.1 K/UL (ref 1.2–3.5)
LYMPHOCYTES NFR BLD: 22 %
MCH RBC QN AUTO: 29 PG (ref 28–33.2)
MCHC RBC AUTO-ENTMCNC: 31.8 G/DL (ref 32.2–35.5)
MCV RBC AUTO: 91.2 FL (ref 83–98)
MONOCYTES # BLD: 0.1 K/UL (ref 0.28–0.8)
MONOCYTES NFR BLD: 2 %
NEUTS BAND # BLD: 0.05 K/UL (ref 0–0.53)
NEUTS BAND # BLD: 3.45 K/UL (ref 1.7–7)
NEUTS BAND NFR BLD: 1 %
NEUTS SEG NFR BLD: 69 %
P AXIS: 48
PDW BLD AUTO: 10.6 FL (ref 9.4–12.3)
PLAT MORPH BLD: NORMAL
PLATELET # BLD AUTO: 195 K/UL (ref 150–369)
PLATELET # BLD EST: ABNORMAL 10*3/UL
POCT TEST: ABNORMAL
POTASSIUM SERPL-SCNC: 3.7 MEQ/L (ref 3.6–5.1)
PR INTERVAL: 156
PROT SERPL-MCNC: 7.2 G/DL (ref 6–8.2)
QRS DURATION: 88
QT INTERVAL: 370
QTC CALCULATION(BAZETT): 455
R AXIS: -8
RBC # BLD AUTO: 5.13 M/UL (ref 3.93–5.22)
SODIUM SERPL-SCNC: 134 MEQ/L (ref 136–144)
T WAVE AXIS: 9
TARGETS BLD QL SMEAR: ABNORMAL
VARIANT LYMPHS # BLD MANUAL: 0.3 K/UL
VARIANT LYMPHS NFR BLD: 6 %
VENTRICULAR RATE: 91
WBC # BLD AUTO: 5 K/UL (ref 3.8–10.5)

## 2021-03-29 PROCEDURE — 63600000 HC DRUGS/DETAIL CODE: Performed by: HOSPITALIST

## 2021-03-29 PROCEDURE — 85025 COMPLETE CBC W/AUTO DIFF WBC: CPT | Performed by: HOSPITALIST

## 2021-03-29 PROCEDURE — 87040 BLOOD CULTURE FOR BACTERIA: CPT | Performed by: INTERNAL MEDICINE

## 2021-03-29 PROCEDURE — 99233 SBSQ HOSP IP/OBS HIGH 50: CPT | Mod: CR | Performed by: HOSPITALIST

## 2021-03-29 PROCEDURE — 63700000 HC SELF-ADMINISTRABLE DRUG

## 2021-03-29 PROCEDURE — 20600000 HC ROOM AND CARE INTERMEDIATE/TELEMETRY

## 2021-03-29 PROCEDURE — 80053 COMPREHEN METABOLIC PANEL: CPT | Performed by: HOSPITALIST

## 2021-03-29 PROCEDURE — 36415 COLL VENOUS BLD VENIPUNCTURE: CPT | Performed by: HOSPITALIST

## 2021-03-29 PROCEDURE — 63700000 HC SELF-ADMINISTRABLE DRUG: Performed by: HOSPITALIST

## 2021-03-29 PROCEDURE — 25800000 HC PHARMACY IV SOLUTIONS: Performed by: INTERNAL MEDICINE

## 2021-03-29 PROCEDURE — 25000000 HC PHARMACY GENERAL: Performed by: INTERNAL MEDICINE

## 2021-03-29 PROCEDURE — XW033E5 INTRODUCTION OF REMDESIVIR ANTI-INFECTIVE INTO PERIPHERAL VEIN, PERCUTANEOUS APPROACH, NEW TECHNOLOGY GROUP 5: ICD-10-PCS | Performed by: HOSPITALIST

## 2021-03-29 RX ORDER — ACETAMINOPHEN 325 MG/1
650 TABLET ORAL EVERY 4 HOURS PRN
Status: DISCONTINUED | OUTPATIENT
Start: 2021-03-29 | End: 2021-04-03 | Stop reason: HOSPADM

## 2021-03-29 RX ORDER — GUAIFENESIN 600 MG/1
600 TABLET, EXTENDED RELEASE ORAL 2 TIMES DAILY
Status: DISCONTINUED | OUTPATIENT
Start: 2021-03-29 | End: 2021-03-31

## 2021-03-29 RX ORDER — ACETAMINOPHEN 325 MG/1
TABLET ORAL
Status: COMPLETED
Start: 2021-03-29 | End: 2021-03-29

## 2021-03-29 RX ADMIN — ENOXAPARIN SODIUM 40 MG: 40 INJECTION SUBCUTANEOUS at 17:04

## 2021-03-29 RX ADMIN — REMDESIVIR 200 MG: 100 INJECTION, POWDER, LYOPHILIZED, FOR SOLUTION INTRAVENOUS at 12:19

## 2021-03-29 RX ADMIN — GUAIFENESIN 600 MG: 600 TABLET, EXTENDED RELEASE ORAL at 20:18

## 2021-03-29 RX ADMIN — DEXAMETHASONE SODIUM PHOSPHATE 6 MG: 4 INJECTION, SOLUTION INTRA-ARTICULAR; INTRALESIONAL; INTRAMUSCULAR; INTRAVENOUS; SOFT TISSUE at 10:45

## 2021-03-29 RX ADMIN — BENZONATATE 200 MG: 100 CAPSULE ORAL at 08:21

## 2021-03-29 RX ADMIN — ACETAMINOPHEN 325 MG: 325 TABLET ORAL at 12:47

## 2021-03-29 RX ADMIN — INSULIN ASPART 3 UNITS: 100 INJECTION, SOLUTION INTRAVENOUS; SUBCUTANEOUS at 22:51

## 2021-03-29 RX ADMIN — INSULIN ASPART 3 UNITS: 100 INJECTION, SOLUTION INTRAVENOUS; SUBCUTANEOUS at 17:05

## 2021-03-29 RX ADMIN — METOPROLOL SUCCINATE 50 MG: 50 TABLET, EXTENDED RELEASE ORAL at 08:21

## 2021-03-29 NOTE — PROGRESS NOTES
"Infectious Disease Progress Note    Patient Name: Telma Lane  MR#: [id number]  : 1965  Admission Date: 3/28/2021  Date: 21   Time: 9:52 AM   Author: Keysha Taylor MD    Major Events:     Antibiotics:        Subjective  On 2 to 3 L of oxygen supplementation by nasal cannula  No fever  On Decadron  WBC 5.0  Got SOB walking from the bathroom  Coughing  No sig sputum  Discussed remdesivir again with the patient today, we had already discussed this in the emergency room yesterday and she is agreeable to starting since requiring more oxygen    Review of Systems    Pertinent items are noted in HPI.    Objective     Vital Signs:    Patient Vitals for the past 72 hrs:   BP Temp Temp src Pulse Resp SpO2 Height Weight   21 0814 140/80 36.9 °C (98.4 °F) Oral 91 20 94 % -- --   21 0000 138/76 36.9 °C (98.4 °F) Oral 85 16 96 % -- --   21 2000 (!) 147/77 36.7 °C (98 °F) Oral 94 18 95 % -- --   21 1612 127/64 37.1 °C (98.8 °F) Oral 95 18 93 % -- --   21 1330 132/82 37.4 °C (99.4 °F) Oral 92 18 92 % 1.676 m (5' 5.98\") 102 kg (225 lb 9.6 oz)   21 1216 127/64 -- -- -- 18 92 % -- --   21 1155 132/66 37.8 °C (100.1 °F) Oral -- 18 93 % -- --   21 1101 136/67 -- -- -- (!) 22 95 % -- --   21 0924 (!) 141/78 37.5 °C (99.5 °F) Oral -- (!) 22 (!) 87 % 1.676 m (5' 6\") 108 kg (238 lb)       Temp (72hrs), Av.2 °C (98.9 °F), Min:36.7 °C (98 °F), Max:37.8 °C (100.1 °F)      Physical Exam:    Awake, alert, mild conversational dyspnea, EOMI, dry cough, S1-S2, abdomen soft, oriented to time place and person    Lines, Drains, Airways, Wounds:  Peripheral IV 21 Right Antecubital (Active)   Number of days: 1       Labs:    CBC Results       21                       0847 1005          WBC 5.00 4.15          RBC 5.13 4.35          HGB 14.9 13.1          HCT 46.8 37.7          MCV 91.2 86.7          MCH 29.0 30.1          MCHC 31.8 34.7           194  "                    BMP Results       03/29/21 03/28/21                       0847 1005           135          K 3.7 3.3          Cl 100 101          CO2 22 23          Glucose 113 124          BUN 9 9          Creatinine 0.7 0.6          Calcium 8.5 8.1          Anion Gap 12 11          EGFR >60.0 >60.0          Comment for K at 1005 on 03/28/21: Results obtained on plasma. Plasma Potassium values may be up to 0.4 mEQ/L less than serum values. The differences may be greater for patients with high platelet or white cell counts.      PT/PTT Results     No lab values to display.      UA Results       03/28/21                          1102           Color Yellow           Clarity Clear           Glucose Negative           Bilirubin Negative           Ketones +1           Sp Grav 1.020           Blood Negative           Ph 6.5           Protein Trace           Urobilinogen 0.2           Nitrite Negative           Leuk Est Negative           WBC 0 TO 3           RBC 0 TO 4           Bacteria None Seen           Comment for Ketones at 1102 on 03/28/21: Free sulfhydryl drugs such as Mesna, Capoten, and Acetylcysteine (Mucomyst) may cause false positive ketonuria.    Comment for Blood at 1102 on 03/28/21: The sensitivity of the occult blood test is equivalent to approximately 4 intact RBC/HPF.    Comment for Protein at 1102 on 03/28/21: Trace False Positive Protein can be seen with alkaline or highly buffered urines or urine with high specific gravity. Suggest clinical correlation.    Comment for Leuk Est at 1102 on 03/28/21: Results can be falsely negative due to high specific gravity, some antibiotics, glucose >3 g/dl, or WBC other than neutrophils.      Lactate Results     No lab values to display.          Microbiology Results     Procedure Component Value Units Date/Time    SARS-CoV-2 (COVID-19), PCR Nasopharynx [[id number]]  (Abnormal) Collected: 03/28/21 1005    Specimen: Nasopharyngeal Swab from Nasopharynx  Updated: 03/28/21 1104    Narrative:      The following orders were created for panel order SARS-CoV-2 (COVID-19), PCR Nasopharynx.  Procedure                               Abnormality         Status                     ---------                               -----------         ------                     SARS-COV-2 (COVID-19)/ F...[[id number]]  Abnormal            Final result                 Please view results for these tests on the individual orders.    SARS-COV-2 (COVID-19)/ FLU A/B, AND RSV, PCR Nasopharynx [[id number]]  (Abnormal) Collected: 03/28/21 1005    Specimen: Nasopharyngeal Swab from Nasopharynx Updated: 03/28/21 1104     SARS-CoV-2 (COVID-19) Positive     Influenza A Negative     Influenza B Negative     Respiratory Syncytial Virus Negative          Pathology Results     ** No results found for the last 720 hours. **          Echo:          Imaging:    Radiology Imaging   XR CHEST 1 VW    Narrative CLINICAL HISTORY: Fever    COMMENT: Single view chest      Impression IMPRESSION:    The lungs are hypoinflated.  The superior mediastinum and cardiac silhouette are  magnified due to technique.  There is consolidative opacity within the mid and  left lower lung with air bronchograms, in keeping with a pneumonia.  No  pneumothorax or vascular congestion.  PA and lateral radiographs are recommended  in 6-8 weeks to document resolution of imaging findings.       Assessment and plan    COVID-19  COVID-19 pneumonia  Today requiring higher oxygen than yesterday, mild conversational dyspnea  Cough but no sputum production  Already on Decadron  WBC 5.0  Creatinine 0.7, creatinine clearance 109.5  AST/ALT 58/40  No cultures to review  Initiate remdesivir, discussed with patient, she is agreeable, monitor daily CBC and CMP, blood cultures afebrile    NOTE: Some or all of the note above was created with the use of dictation software, please note this dictation software can have anomalies in its ability to transcribe.  Please contact me directly for anything that seems abnormal for clarification.

## 2021-03-29 NOTE — PROGRESS NOTES
Hospital Medicine Service -  Daily Progress Note       SUBJECTIVE   Patient seen and examined earlier this morning.   Resting in bed  Feeling tired  Continues to have MATIAS  No CP  No additional complaint at this time     OBJECTIVE      Vital signs in last 24 hours:  Temp:  [36.7 °C (98 °F)-38.4 °C (101.2 °F)] 38.4 °C (101.2 °F)  Heart Rate:  [85-95] 91  Resp:  [16-20] 20  BP: (123-147)/(64-82) 123/74    PHYSICAL EXAMINATION        General: no acute distress  HEENT:  PERRL, anicteric sclera   Neck: supple, no JVD  Cardiac: RRR, +S1/S2, no murmur, no rub   Lungs: coarse bilaterally, no wheezing   Abdomen: soft, NT/ND, +BS, no rebound/guarding   Extremities: no edema, distal perfusion intact  Neuro: AAOx3, nonfocal.   Skin: no rash. Clean, dry, intact.   Psych: cooperative     LABS / IMAGING / TELE      Labs (personally reviewed):  Results from last 7 days   Lab Units 03/29/21  0847   SODIUM mEQ/L 134*   POTASSIUM mEQ/L 3.7   CHLORIDE mEQ/L 100   CO2 mEQ/L 22   BUN mg/dL 9   CREATININE mg/dL 0.7   GLUCOSE mg/dL 113*   CALCIUM mg/dL 8.5*       Results from last 7 days   Lab Units 03/28/21  1005   MAGNESIUM mg/dL 1.6*    Results from last 7 days   Lab Units 03/29/21  0847   WBC K/uL 5.00   HEMOGLOBIN g/dL 14.9   HEMATOCRIT % 46.8*   PLATELETS K/uL 195                Microbiology Data (personally reviewed):  Microbiology Results     Procedure Component Value Units Date/Time    SARS-CoV-2 (COVID-19), PCR Nasopharynx [[id number]]  (Abnormal) Collected: 03/28/21 1005    Specimen: Nasopharyngeal Swab from Nasopharynx Updated: 03/28/21 1104    Narrative:      The following orders were created for panel order SARS-CoV-2 (COVID-19), PCR Nasopharynx.  Procedure                               Abnormality         Status                     ---------                               -----------         ------                     SARS-COV-2 (COVID-19)/ F...[[id number]]  Abnormal            Final result                 Please view results  for these tests on the individual orders.    SARS-COV-2 (COVID-19)/ FLU A/B, AND RSV, PCR Nasopharynx [[id number]]  (Abnormal) Collected: 03/28/21 1005    Specimen: Nasopharyngeal Swab from Nasopharynx Updated: 03/28/21 1104     SARS-CoV-2 (COVID-19) Positive     Influenza A Negative     Influenza B Negative     Respiratory Syncytial Virus Negative          Imaging  I have independently reviewed the pertinent imaging from the last 24 hrs.    ECG/Telemetry  Telemetry reviewed    ASSESSMENT AND PLAN      * Pneumonia due to COVID-19 virus  Assessment & Plan  + COVID testing with some imaging changes on CXR  Now with mild hypoxia requiring low dose oxygen supplementation  Currently on 3L nc  Decadron started in the ED  ID following; defer to ID on additional therapies and remdesivir  Continue supportive care  Wean O2 as tolerated  Modified proning   Incentive spirometry    Acute respiratory failure with hypoxia (CMS/HCC)  Assessment & Plan  2/2 COVID PNA  Cont supplemental oxygen, wean as able  Home O2 eval prior to discharge    Type 2 diabetes mellitus without complication, without long-term current use of insulin (CMS/HCC)  Assessment & Plan  Usually on metformin at home  Hold while inpatient  Accucheck and SSI    Hyperlipidemia  Assessment & Plan  Atorvastatin    Benign essential HTN  Assessment & Plan  Continue metoprolol per home regimen       VTE Assessment: Padua    VTE Prophylaxis Plan: Current anticoagulant orders:              enoxaparin (LOVENOX) syringe 40 mg  Daily (6p)       Code Status: Full Code    Estimated discharge date: 3/31/2021       Delvis Uriarte DO  3/29/2021  1:03 PM

## 2021-03-29 NOTE — PATIENT CARE CONFERENCE
Care Progression Rounds Note  Date: 3/29/2021  Time: 11:27 AM     Patient Name: Telma Lane     Medical Record Number: [id number]   YOB: 1965  Sex: Female      Room/Bed: 4507W    Admitting Diagnosis: Hypoxia [R09.02]  Pneumonia of left lower lobe due to infectious organism [J18.9]  Pneumonia due to COVID-19 virus [U07.1, J12.82]  2019 novel coronavirus-infected pneumonia (NCIP) [U07.1, J12.82]   Admit Date/Time: 3/28/2021  9:04 AM    Primary Diagnosis: Pneumonia due to COVID-19 virus  Principal Problem: Pneumonia due to COVID-19 virus    GMLOS: pending  Anticipated Discharge Date: 3/30/2021    AM-PAC  Mobility Score:      Discharge Planning:  Living Arrangements: house  Anticipated Discharge Disposition: home without services    Barriers to Discharge:  Barriers to Discharge: Medical issues not resolved  Comment: 3L- possible wean, OOB, decadron, home 02 eval today vs tomorrow    Participants:  , dietitian/nutrition services, nursing, social work/services

## 2021-03-30 PROBLEM — E87.5 HYPERKALEMIA: Status: ACTIVE | Noted: 2021-03-30

## 2021-03-30 LAB
ALBUMIN SERPL-MCNC: 3.4 G/DL (ref 3.4–5)
ALP SERPL-CCNC: 89 IU/L (ref 35–126)
ALT SERPL-CCNC: 40 IU/L (ref 11–54)
ANION GAP SERPL CALC-SCNC: 10 MEQ/L (ref 3–15)
AST SERPL-CCNC: 52 IU/L (ref 15–41)
BASOPHILS # BLD: 0 K/UL (ref 0.01–0.1)
BASOPHILS NFR BLD: 0 %
BILIRUB SERPL-MCNC: 0.5 MG/DL (ref 0.3–1.2)
BUN SERPL-MCNC: 15 MG/DL (ref 8–20)
CALCIUM SERPL-MCNC: 8.6 MG/DL (ref 8.9–10.3)
CHLORIDE SERPL-SCNC: 102 MEQ/L (ref 98–109)
CK SERPL-CCNC: 1054 U/L (ref 15–200)
CO2 SERPL-SCNC: 26 MEQ/L (ref 22–32)
CREAT SERPL-MCNC: 0.7 MG/DL (ref 0.6–1.1)
CRP SERPL-MCNC: 67.2 MG/L
DIFFERENTIAL METHOD BLD: ABNORMAL
EOSINOPHIL # BLD: 0 K/UL (ref 0.04–0.36)
EOSINOPHIL NFR BLD: 0 %
ERYTHROCYTE [DISTWIDTH] IN BLOOD BY AUTOMATED COUNT: 13.8 % (ref 11.7–14.4)
FERRITIN SERPL-MCNC: 313 NG/ML (ref 11–250)
GFR SERPL CREATININE-BSD FRML MDRD: >60 ML/MIN/1.73M*2
GIANT PLATELETS BLD QL SMEAR: ABNORMAL
GLUCOSE BLD-MCNC: 109 MG/DL (ref 70–99)
GLUCOSE BLD-MCNC: 138 MG/DL (ref 70–99)
GLUCOSE BLD-MCNC: 178 MG/DL (ref 70–99)
GLUCOSE BLD-MCNC: 99 MG/DL (ref 70–99)
GLUCOSE SERPL-MCNC: 122 MG/DL (ref 70–99)
HCT VFR BLDCO AUTO: 39.1 % (ref 35–45)
HGB BLD-MCNC: 13.2 G/DL (ref 11.8–15.7)
LDH SERPL L TO P-CCNC: 388 IU/L (ref 98–192)
LYMPHOCYTES # BLD: 1.24 K/UL (ref 1.2–3.5)
LYMPHOCYTES NFR BLD: 19 %
MCH RBC QN AUTO: 29.7 PG (ref 28–33.2)
MCHC RBC AUTO-ENTMCNC: 33.8 G/DL (ref 32.2–35.5)
MCV RBC AUTO: 87.9 FL (ref 83–98)
MONOCYTES # BLD: 0.59 K/UL (ref 0.28–0.8)
MONOCYTES NFR BLD: 9 %
NEUTS BAND # BLD: 0.07 K/UL (ref 0–0.53)
NEUTS BAND # BLD: 4.51 K/UL (ref 1.7–7)
NEUTS BAND NFR BLD: 1 %
NEUTS SEG NFR BLD: 69 %
PDW BLD AUTO: 11 FL (ref 9.4–12.3)
PLATELET # BLD AUTO: 263 K/UL (ref 150–369)
PLATELET # BLD EST: ABNORMAL 10*3/UL
POCT TEST: ABNORMAL
POCT TEST: NORMAL
POLYCHROMASIA BLD QL SMEAR: ABNORMAL
POTASSIUM SERPL-SCNC: 5.2 MEQ/L (ref 3.6–5.1)
PROT SERPL-MCNC: 6.8 G/DL (ref 6–8.2)
RBC # BLD AUTO: 4.45 M/UL (ref 3.93–5.22)
SODIUM SERPL-SCNC: 138 MEQ/L (ref 136–144)
VARIANT LYMPHS # BLD MANUAL: 0.13 K/UL
VARIANT LYMPHS NFR BLD: 2 %
WBC # BLD AUTO: 6.54 K/UL (ref 3.8–10.5)

## 2021-03-30 PROCEDURE — 99233 SBSQ HOSP IP/OBS HIGH 50: CPT | Mod: CR | Performed by: HOSPITALIST

## 2021-03-30 PROCEDURE — 83615 LACTATE (LD) (LDH) ENZYME: CPT | Performed by: HOSPITALIST

## 2021-03-30 PROCEDURE — 63600000 HC DRUGS/DETAIL CODE: Mod: JW | Performed by: HOSPITALIST

## 2021-03-30 PROCEDURE — 86140 C-REACTIVE PROTEIN: CPT | Performed by: HOSPITALIST

## 2021-03-30 PROCEDURE — 63700000 HC SELF-ADMINISTRABLE DRUG: Performed by: HOSPITALIST

## 2021-03-30 PROCEDURE — 82728 ASSAY OF FERRITIN: CPT | Performed by: HOSPITALIST

## 2021-03-30 PROCEDURE — 36415 COLL VENOUS BLD VENIPUNCTURE: CPT | Performed by: HOSPITALIST

## 2021-03-30 PROCEDURE — 25000000 HC PHARMACY GENERAL: Performed by: INTERNAL MEDICINE

## 2021-03-30 PROCEDURE — 82550 ASSAY OF CK (CPK): CPT | Performed by: HOSPITALIST

## 2021-03-30 PROCEDURE — 25800000 HC PHARMACY IV SOLUTIONS: Performed by: INTERNAL MEDICINE

## 2021-03-30 PROCEDURE — 80053 COMPREHEN METABOLIC PANEL: CPT | Performed by: HOSPITALIST

## 2021-03-30 PROCEDURE — 20600000 HC ROOM AND CARE INTERMEDIATE/TELEMETRY

## 2021-03-30 PROCEDURE — 63600000 HC DRUGS/DETAIL CODE: Performed by: HOSPITALIST

## 2021-03-30 PROCEDURE — 85025 COMPLETE CBC W/AUTO DIFF WBC: CPT | Performed by: HOSPITALIST

## 2021-03-30 PROCEDURE — 25800000 HC PHARMACY IV SOLUTIONS: Performed by: HOSPITALIST

## 2021-03-30 RX ORDER — MORPHINE SULFATE 2 MG/ML
2 INJECTION, SOLUTION INTRAMUSCULAR; INTRAVENOUS EVERY 4 HOURS PRN
Status: DISCONTINUED | OUTPATIENT
Start: 2021-03-30 | End: 2021-04-03 | Stop reason: HOSPADM

## 2021-03-30 RX ORDER — ACETAMINOPHEN 160 MG/5ML
200 SUSPENSION, ORAL (FINAL DOSE FORM) ORAL DAILY
COMMUNITY
End: 2022-07-13

## 2021-03-30 RX ORDER — POLYETHYLENE GLYCOL 3350 17 G/17G
17 POWDER, FOR SOLUTION ORAL DAILY PRN
Status: DISCONTINUED | OUTPATIENT
Start: 2021-03-30 | End: 2021-04-03 | Stop reason: HOSPADM

## 2021-03-30 RX ORDER — ATORVASTATIN CALCIUM 40 MG/1
20 TABLET, FILM COATED ORAL DAILY
COMMUNITY

## 2021-03-30 RX ORDER — IBUPROFEN 100 MG/5ML
1000 SUSPENSION, ORAL (FINAL DOSE FORM) ORAL DAILY
COMMUNITY

## 2021-03-30 RX ORDER — AZITHROMYCIN 250 MG/1
1 TABLET, FILM COATED ORAL DAILY
COMMUNITY
Start: 2021-03-25 | End: 2021-04-03 | Stop reason: HOSPADM

## 2021-03-30 RX ORDER — METOPROLOL SUCCINATE 50 MG/1
1 TABLET, EXTENDED RELEASE ORAL DAILY
COMMUNITY
Start: 2021-03-25 | End: 2022-07-13

## 2021-03-30 RX ORDER — POLYETHYLENE GLYCOL 3350 17 G/17G
17 POWDER, FOR SOLUTION ORAL ONCE
Status: COMPLETED | OUTPATIENT
Start: 2021-03-30 | End: 2021-03-30

## 2021-03-30 RX ORDER — CETIRIZINE HYDROCHLORIDE 10 MG/1
10 TABLET ORAL DAILY
COMMUNITY

## 2021-03-30 RX ORDER — IBUPROFEN 200 MG
400 TABLET ORAL EVERY 6 HOURS PRN
COMMUNITY
End: 2021-04-03 | Stop reason: HOSPADM

## 2021-03-30 RX ORDER — METFORMIN HYDROCHLORIDE 500 MG/1
1 TABLET ORAL EVERY MORNING
COMMUNITY
Start: 2020-12-20

## 2021-03-30 RX ORDER — SODIUM CHLORIDE 9 MG/ML
100 INJECTION, SOLUTION INTRAVENOUS CONTINUOUS
Status: ACTIVE | OUTPATIENT
Start: 2021-03-30 | End: 2021-03-30

## 2021-03-30 RX ORDER — SENNOSIDES 8.6 MG/1
1 TABLET ORAL NIGHTLY PRN
Status: DISCONTINUED | OUTPATIENT
Start: 2021-03-30 | End: 2021-04-03 | Stop reason: HOSPADM

## 2021-03-30 RX ORDER — PROMETHAZINE HYDROCHLORIDE AND DEXTROMETHORPHAN HYDROBROMIDE 6.25; 15 MG/5ML; MG/5ML
5 SYRUP ORAL EVERY 6 HOURS PRN
COMMUNITY
Start: 2021-03-25 | End: 2022-07-13

## 2021-03-30 RX ORDER — CYANOCOBALAMIN (VITAMIN B-12) 500 MCG
400 TABLET ORAL DAILY
COMMUNITY
End: 2022-07-13

## 2021-03-30 RX ADMIN — ACETAMINOPHEN 650 MG: 325 TABLET ORAL at 20:41

## 2021-03-30 RX ADMIN — DEXAMETHASONE SODIUM PHOSPHATE 6 MG: 4 INJECTION, SOLUTION INTRA-ARTICULAR; INTRALESIONAL; INTRAMUSCULAR; INTRAVENOUS; SOFT TISSUE at 11:00

## 2021-03-30 RX ADMIN — SODIUM CHLORIDE 100 ML/HR: 9 INJECTION, SOLUTION INTRAVENOUS at 08:18

## 2021-03-30 RX ADMIN — ENOXAPARIN SODIUM 40 MG: 40 INJECTION SUBCUTANEOUS at 18:19

## 2021-03-30 RX ADMIN — MORPHINE SULFATE 2 MG: 2 INJECTION, SOLUTION INTRAMUSCULAR; INTRAVENOUS at 15:28

## 2021-03-30 RX ADMIN — GUAIFENESIN 600 MG: 600 TABLET, EXTENDED RELEASE ORAL at 08:18

## 2021-03-30 RX ADMIN — BENZONATATE 200 MG: 100 CAPSULE ORAL at 15:26

## 2021-03-30 RX ADMIN — REMDESIVIR 100 MG: 100 INJECTION, POWDER, LYOPHILIZED, FOR SOLUTION INTRAVENOUS at 11:58

## 2021-03-30 RX ADMIN — METOPROLOL SUCCINATE 50 MG: 50 TABLET, EXTENDED RELEASE ORAL at 08:18

## 2021-03-30 RX ADMIN — POLYETHYLENE GLYCOL 3350 17 G: 17 POWDER, FOR SOLUTION ORAL at 08:19

## 2021-03-30 RX ADMIN — GUAIFENESIN 600 MG: 600 TABLET, EXTENDED RELEASE ORAL at 20:40

## 2021-03-30 NOTE — PROGRESS NOTES
Hospital Medicine Service -  Daily Progress Note       SUBJECTIVE   Patient seen and examined earlier this morning.   Resting in chair  Feeling a bit better today  Continues to have MATIAS and cough  No CP  No additional complaint at this time     OBJECTIVE      Vital signs in last 24 hours:  Temp:  [36.4 °C (97.6 °F)-37.3 °C (99.1 °F)] 37.3 °C (99.1 °F)  Heart Rate:  [74-95] 95  Resp:  [16-20] 18  BP: (114-139)/(51-79) 126/61    PHYSICAL EXAMINATION        General: no acute distress  HEENT:  PERRL, anicteric sclera   Neck: supple, no JVD  Cardiac: RRR, +S1/S2, no murmur, no rub   Lungs: coarse bilaterally, no wheezing   Abdomen: soft, NT/ND, +BS, no rebound/guarding   Extremities: no edema, distal perfusion intact  Neuro: AAOx3, nonfocal.   Skin: no rash. Clean, dry, intact.   Psych: cooperative     LABS / IMAGING / TELE      Labs (personally reviewed):  Results from last 7 days   Lab Units 03/30/21  0312   SODIUM mEQ/L 138   POTASSIUM mEQ/L 5.2*   CHLORIDE mEQ/L 102   CO2 mEQ/L 26   BUN mg/dL 15   CREATININE mg/dL 0.7   GLUCOSE mg/dL 122*   CALCIUM mg/dL 8.6*       Results from last 7 days   Lab Units 03/28/21  1005   MAGNESIUM mg/dL 1.6*    Results from last 7 days   Lab Units 03/30/21  0312   WBC K/uL 6.54   HEMOGLOBIN g/dL 13.2   HEMATOCRIT % 39.1   PLATELETS K/uL 263                Microbiology Data (personally reviewed):  Microbiology Results     Procedure Component Value Units Date/Time    SARS-CoV-2 (COVID-19), PCR Nasopharynx [[id number]]  (Abnormal) Collected: 03/28/21 1005    Specimen: Nasopharyngeal Swab from Nasopharynx Updated: 03/28/21 1104    Narrative:      The following orders were created for panel order SARS-CoV-2 (COVID-19), PCR Nasopharynx.  Procedure                               Abnormality         Status                     ---------                               -----------         ------                     SARS-COV-2 (COVID-19)/ F...[[id number]]  Abnormal            Final result                  Please view results for these tests on the individual orders.    SARS-COV-2 (COVID-19)/ FLU A/B, AND RSV, PCR Nasopharynx [[id number]]  (Abnormal) Collected: 03/28/21 1005    Specimen: Nasopharyngeal Swab from Nasopharynx Updated: 03/28/21 1104     SARS-CoV-2 (COVID-19) Positive     Influenza A Negative     Influenza B Negative     Respiratory Syncytial Virus Negative          Imaging  I have independently reviewed the pertinent imaging from the last 24 hrs.    ECG/Telemetry  Telemetry reviewed    ASSESSMENT AND PLAN      * Pneumonia due to COVID-19 virus  Assessment & Plan  + COVID testing with some imaging changes on CXR  Now with mild hypoxia requiring low dose oxygen supplementation  Currently on 3L nc  Decadron started in the ED  ID following; on remdesivir  Continue supportive care  Wean O2 as tolerated  Modified proning   Incentive spirometry    Acute respiratory failure with hypoxia (CMS/HCC)  Assessment & Plan  2/2 COVID PNA  Cont supplemental oxygen, wean as able  Home O2 eval prior to discharge    Hyperkalemia  Assessment & Plan  Mild hyperkalemia today, K 5.2  Change diet to low K diet  Gently hydrate  Start bowel regimen.  Repeat BMP in am    Type 2 diabetes mellitus without complication, without long-term current use of insulin (CMS/HCC)  Assessment & Plan  Usually on metformin at home  Hold while inpatient  Accucheck and SSI    Hyperlipidemia  Assessment & Plan  Atorvastatin    Benign essential HTN  Assessment & Plan  Continue metoprolol per home regimen       VTE Assessment: Padua    VTE Prophylaxis Plan: Current anticoagulant orders:              enoxaparin (LOVENOX) syringe 40 mg  Daily (6p)       Code Status: Full Code    Estimated discharge date: 4/1/2021     Delvis Uriarte DO  3/30/2021  1:27 PM

## 2021-03-30 NOTE — ASSESSMENT & PLAN NOTE
Mild hyperkalemia  K 5.2 has now normalized.  Continue low K diet  Gently hydrated  Started bowel regimen.  Resolved.  Monitor BMP - follow up repeat labs with PCP within the week

## 2021-03-30 NOTE — PROGRESS NOTES
Infectious Disease Progress Note    Patient Name: Telma Lane  MR#: [id number]  : 1965  Admission Date: 3/28/2021  Date: 21   Time: 10:43 AM   Author: ORLIN Hyde    Major Events:     Antibiotics:    Anti-infectives (From admission, onward)    Start     Dose/Rate Route Frequency Ordered Stop    21 1144  remdesivir (VEKLURY) 100 mg in sodium chloride 0.9 % 250 mL IVPB      100 mg  500 mL/hr over 30 Minutes intravenous Every 24 hours interval 21 1059 21 1144          Subjective   Patient states she does not feel as perky today as she did yesterday and still coughing a lot which makes her feel short of breath, no productive sputum, states the diarrhea is decreased, she is proning and states she was able to sleep on her stomach last night, d/w nursing     Review of Systems    Pertinent items are noted in HPI.    Objective     Vital Signs:    Patient Vitals for the past 72 hrs:   BP Temp Temp src Pulse Resp SpO2 Height Weight   21 0800 123/73 36.8 °C (98.3 °F) Oral 94 18 93 % -- --   21 0400 137/79 36.8 °C (98.2 °F) Oral 74 18 94 % -- --   21 0000 139/73 36.4 °C (97.6 °F) Oral 87 16 94 % -- --   21 2000 (!) 120/51 36.6 °C (97.9 °F) Oral 85 18 94 % -- --   21 1519 114/62 36.6 °C (97.9 °F) Oral 79 20 95 % -- --   21 1249 132/65 (!) 38.4 °C (101.2 °F) Oral 90 18 94 % -- --   21 1247 -- (!) 38.4 °C (101.2 °F) -- -- -- -- -- --   21 1232 132/68 (!) 38.1 °C (100.6 °F) Oral 88 20 93 % -- --   21 1219 137/70 (!) 38.4 °C (101.2 °F) Oral 90 20 94 % -- --   21 1139 123/74 36.8 °C (98.2 °F) Oral 91 20 93 % -- --   21 0814 140/80 36.9 °C (98.4 °F) Oral 91 20 94 % -- --   21 0000 138/76 36.9 °C (98.4 °F) Oral 85 16 96 % -- --   21 (!) 147/77 36.7 °C (98 °F) Oral 94 18 95 % -- --   21 1612 127/64 37.1 °C (98.8 °F) Oral 95 18 93 % -- --   21 1330 132/82 37.4 °C (99.4 °F) Oral 92 18 92 % 1.676 m  "(5' 5.98\") 102 kg (225 lb 9.6 oz)   21 1216 127/64 -- -- -- 18 92 % -- --   21 1155 132/66 37.8 °C (100.1 °F) Oral -- 18 93 % -- --   21 1101 136/67 -- -- -- (!) 22 95 % -- --   21 0924 (!) 141/78 37.5 °C (99.5 °F) Oral -- (!) 22 (!) 87 % 1.676 m (5' 6\") 108 kg (238 lb)       Temp (72hrs), Av.3 °C (99.1 °F), Min:36.4 °C (97.6 °F), Max:38.4 °C (101.2 °F)      Physical Exam:    Visit Vitals  /73 (BP Location: Left upper arm, Patient Position: Lying)   Pulse 94   Temp 36.8 °C (98.3 °F) (Oral)   Resp 18   Ht 1.676 m (5' 5.98\")   Wt 102 kg (225 lb 9.6 oz)   SpO2 93%   BMI 36.43 kg/m²     General appearance: alert, appears stated age, cooperative and no distress  Eyes: anicteric  Lungs: diminished breath sounds bilateral and no wheezes or rhonchi, intermittent dry hacking coughing spells with labored breathing but able to recover wihtout labored breathing at rest  Abdomen: soft, ND, NT  Neurologic: Mental status: Alert, oriented, thought content appropriate    Lines, Drains, Airways, Wounds:  Peripheral IV 21 Right Antecubital (Active)   Number of days: 2       Labs:    CBC Results       21                    0312 0847 1005         WBC 6.54 5.00 4.15         RBC 4.45 5.13 4.35         HGB 13.2 14.9 13.1         HCT 39.1 46.8 37.7         MCV 87.9 91.2 86.7         MCH 29.7 29.0 30.1         MCHC 33.8 31.8 34.7          195 194                   BMP Results       212 0847 1005          134 135         K 5.2 3.7 3.3         Cl 102 100 101         CO2 26 22 23         Glucose 122 113 124         BUN 15 9 9         Creatinine 0.7 0.7 0.6         Calcium 8.6 8.5 8.1         Anion Gap 10 12 11         EGFR >60.0 >60.0 >60.0         Comment for K at 1005 on 21: Results obtained on plasma. Plasma Potassium values may be up to 0.4 mEQ/L less than serum values. The differences may be greater for patients " with high platelet or white cell counts.      PT/PTT Results     No lab values to display.      UA Results       03/28/21                          1102           Color Yellow           Clarity Clear           Glucose Negative           Bilirubin Negative           Ketones +1           Sp Grav 1.020           Blood Negative           Ph 6.5           Protein Trace           Urobilinogen 0.2           Nitrite Negative           Leuk Est Negative           WBC 0 TO 3           RBC 0 TO 4           Bacteria None Seen           Comment for Ketones at 1102 on 03/28/21: Free sulfhydryl drugs such as Mesna, Capoten, and Acetylcysteine (Mucomyst) may cause false positive ketonuria.    Comment for Blood at 1102 on 03/28/21: The sensitivity of the occult blood test is equivalent to approximately 4 intact RBC/HPF.    Comment for Protein at 1102 on 03/28/21: Trace False Positive Protein can be seen with alkaline or highly buffered urines or urine with high specific gravity. Suggest clinical correlation.    Comment for Leuk Est at 1102 on 03/28/21: Results can be falsely negative due to high specific gravity, some antibiotics, glucose >3 g/dl, or WBC other than neutrophils.      Lactate Results     No lab values to display.          Microbiology Results     Procedure Component Value Units Date/Time    SARS-CoV-2 (COVID-19), PCR Nasopharynx [[id number]]  (Abnormal) Collected: 03/28/21 1005    Specimen: Nasopharyngeal Swab from Nasopharynx Updated: 03/28/21 1104    Narrative:      The following orders were created for panel order SARS-CoV-2 (COVID-19), PCR Nasopharynx.  Procedure                               Abnormality         Status                     ---------                               -----------         ------                     SARS-COV-2 (COVID-19)/ F...[[id number]]  Abnormal            Final result                 Please view results for these tests on the individual orders.    SARS-COV-2 (COVID-19)/ FLU A/B, AND RSV,  PCR Nasopharynx [[id number]]  (Abnormal) Collected: 03/28/21 1005    Specimen: Nasopharyngeal Swab from Nasopharynx Updated: 03/28/21 1104     SARS-CoV-2 (COVID-19) Positive     Influenza A Negative     Influenza B Negative     Respiratory Syncytial Virus Negative          Pathology Results     ** No results found for the last 720 hours. **          Echo:          Imaging:    Radiology Imaging   XR CHEST 1 VW    Narrative CLINICAL HISTORY: Fever    COMMENT: Single view chest      Impression IMPRESSION:    The lungs are hypoinflated.  The superior mediastinum and cardiac silhouette are  magnified due to technique.  There is consolidative opacity within the mid and  left lower lung with air bronchograms, in keeping with a pneumonia.  No  pneumothorax or vascular congestion.  PA and lateral radiographs are recommended  in 6-8 weeks to document resolution of imaging findings.       Assessment     Covid 19 PNA with acute hypoxic respiratory failure  - on 3-4 liters NC  - afebrile  - no leukocytosis  - on steroids and remdesivir  - renal and liver function is stable  - blood c/s pending        Plan     Continue remdesivir therapy with daily labs, steroids and supportive care

## 2021-03-30 NOTE — PATIENT CARE CONFERENCE
Care Progression Rounds Note  Date: 3/30/2021  Time: 11:30 AM     Patient Name: Telma Lane     Medical Record Number: [id number]   YOB: 1965  Sex: Female      Room/Bed: 4507W    Admitting Diagnosis: Hypoxia [R09.02]  Pneumonia of left lower lobe due to infectious organism [J18.9]  Pneumonia due to COVID-19 virus [U07.1, J12.82]  2019 novel coronavirus-infected pneumonia (NCIP) [U07.1, J12.82]   Admit Date/Time: 3/28/2021  9:04 AM    Primary Diagnosis: Pneumonia due to COVID-19 virus  Principal Problem: Pneumonia due to COVID-19 virus    GMLOS: 5.4  Anticipated Discharge Date: 3/31/2021    AM-PAC  Mobility Score:      Discharge Planning:  Living Arrangements: house  Anticipated Discharge Disposition: home without services    Barriers to Discharge:  Barriers to Discharge: Medical issues not resolved  Comment: 3L NC, decadron/remdesivir till 4/3, IVF, will need home 02 eval    Participants:  , dietitian/nutrition services, nursing, social work/services

## 2021-03-31 LAB
ALBUMIN SERPL-MCNC: 3.2 G/DL (ref 3.4–5)
ALP SERPL-CCNC: 88 IU/L (ref 35–126)
ALT SERPL-CCNC: 37 IU/L (ref 11–54)
ANION GAP SERPL CALC-SCNC: 10 MEQ/L (ref 3–15)
AST SERPL-CCNC: 44 IU/L (ref 15–41)
BASOPHILS # BLD: 0 K/UL (ref 0.01–0.1)
BASOPHILS NFR BLD: 0 %
BILIRUB SERPL-MCNC: 0.7 MG/DL (ref 0.3–1.2)
BUN SERPL-MCNC: 16 MG/DL (ref 8–20)
CALCIUM SERPL-MCNC: 8.5 MG/DL (ref 8.9–10.3)
CHLORIDE SERPL-SCNC: 104 MEQ/L (ref 98–109)
CO2 SERPL-SCNC: 23 MEQ/L (ref 22–32)
CREAT SERPL-MCNC: 0.5 MG/DL (ref 0.6–1.1)
DIFFERENTIAL METHOD BLD: ABNORMAL
EOSINOPHIL # BLD: 0 K/UL (ref 0.04–0.36)
EOSINOPHIL NFR BLD: 0 %
ERYTHROCYTE [DISTWIDTH] IN BLOOD BY AUTOMATED COUNT: 13.7 % (ref 11.7–14.4)
GFR SERPL CREATININE-BSD FRML MDRD: >60 ML/MIN/1.73M*2
GLUCOSE BLD-MCNC: 125 MG/DL (ref 70–99)
GLUCOSE BLD-MCNC: 138 MG/DL (ref 70–99)
GLUCOSE BLD-MCNC: 166 MG/DL (ref 70–99)
GLUCOSE BLD-MCNC: 246 MG/DL (ref 70–99)
GLUCOSE SERPL-MCNC: 128 MG/DL (ref 70–99)
HCT VFR BLDCO AUTO: 39 % (ref 35–45)
HGB BLD-MCNC: 13 G/DL (ref 11.8–15.7)
LYMPHOCYTES # BLD: 1.61 K/UL (ref 1.2–3.5)
LYMPHOCYTES NFR BLD: 32 %
MCH RBC QN AUTO: 29 PG (ref 28–33.2)
MCHC RBC AUTO-ENTMCNC: 33.3 G/DL (ref 32.2–35.5)
MCV RBC AUTO: 86.9 FL (ref 83–98)
MONOCYTES # BLD: 0.71 K/UL (ref 0.28–0.8)
MONOCYTES NFR BLD: 14 %
NEUTS BAND # BLD: 0.05 K/UL (ref 0–0.53)
NEUTS BAND # BLD: 2.62 K/UL (ref 1.7–7)
NEUTS BAND NFR BLD: 1 %
NEUTS SEG NFR BLD: 52 %
PDW BLD AUTO: 10.6 FL (ref 9.4–12.3)
PLAT MORPH BLD: NORMAL
PLATELET # BLD AUTO: 310 K/UL (ref 150–369)
PLATELET # BLD EST: ABNORMAL 10*3/UL
POCT TEST: ABNORMAL
POLYCHROMASIA BLD QL SMEAR: ABNORMAL
POTASSIUM SERPL-SCNC: 4.4 MEQ/L (ref 3.6–5.1)
PROT SERPL-MCNC: 6.7 G/DL (ref 6–8.2)
RBC # BLD AUTO: 4.49 M/UL (ref 3.93–5.22)
SODIUM SERPL-SCNC: 137 MEQ/L (ref 136–144)
VARIANT LYMPHS # BLD MANUAL: 0.05 K/UL
VARIANT LYMPHS NFR BLD: 1 %
WBC # BLD AUTO: 5.04 K/UL (ref 3.8–10.5)

## 2021-03-31 PROCEDURE — 80053 COMPREHEN METABOLIC PANEL: CPT | Performed by: HOSPITALIST

## 2021-03-31 PROCEDURE — 36415 COLL VENOUS BLD VENIPUNCTURE: CPT | Performed by: HOSPITALIST

## 2021-03-31 PROCEDURE — 25800000 HC PHARMACY IV SOLUTIONS: Performed by: INTERNAL MEDICINE

## 2021-03-31 PROCEDURE — 63700000 HC SELF-ADMINISTRABLE DRUG: Performed by: HOSPITALIST

## 2021-03-31 PROCEDURE — 99232 SBSQ HOSP IP/OBS MODERATE 35: CPT | Mod: CR | Performed by: HOSPITALIST

## 2021-03-31 PROCEDURE — 20600000 HC ROOM AND CARE INTERMEDIATE/TELEMETRY

## 2021-03-31 PROCEDURE — 85025 COMPLETE CBC W/AUTO DIFF WBC: CPT | Performed by: HOSPITALIST

## 2021-03-31 PROCEDURE — 63600000 HC DRUGS/DETAIL CODE: Mod: JW | Performed by: HOSPITALIST

## 2021-03-31 PROCEDURE — 25000000 HC PHARMACY GENERAL: Performed by: INTERNAL MEDICINE

## 2021-03-31 RX ORDER — CODEINE PHOSPHATE AND GUAIFENESIN 10; 100 MG/5ML; MG/5ML
5 SOLUTION ORAL EVERY 4 HOURS PRN
Status: DISCONTINUED | OUTPATIENT
Start: 2021-03-31 | End: 2021-04-03 | Stop reason: HOSPADM

## 2021-03-31 RX ORDER — GUAIFENESIN 600 MG/1
1200 TABLET, EXTENDED RELEASE ORAL 2 TIMES DAILY
Status: DISCONTINUED | OUTPATIENT
Start: 2021-03-31 | End: 2021-03-31

## 2021-03-31 RX ORDER — GUAIFENESIN 600 MG/1
600 TABLET, EXTENDED RELEASE ORAL 2 TIMES DAILY
Status: DISCONTINUED | OUTPATIENT
Start: 2021-03-31 | End: 2021-04-03 | Stop reason: HOSPADM

## 2021-03-31 RX ADMIN — GUAIFENESIN AND CODEINE PHOSPHATE 5 ML: 100; 10 SOLUTION ORAL at 09:02

## 2021-03-31 RX ADMIN — GUAIFENESIN AND CODEINE PHOSPHATE 5 ML: 100; 10 SOLUTION ORAL at 15:02

## 2021-03-31 RX ADMIN — ENOXAPARIN SODIUM 40 MG: 40 INJECTION SUBCUTANEOUS at 17:11

## 2021-03-31 RX ADMIN — INSULIN ASPART 3 UNITS: 100 INJECTION, SOLUTION INTRAVENOUS; SUBCUTANEOUS at 17:11

## 2021-03-31 RX ADMIN — SENNOSIDES 1 TABLET: 8.6 TABLET, FILM COATED ORAL at 20:31

## 2021-03-31 RX ADMIN — METOPROLOL SUCCINATE 50 MG: 50 TABLET, EXTENDED RELEASE ORAL at 08:04

## 2021-03-31 RX ADMIN — ALBUTEROL SULFATE 2 PUFF: 90 AEROSOL, METERED RESPIRATORY (INHALATION) at 15:15

## 2021-03-31 RX ADMIN — ALBUTEROL SULFATE 2 PUFF: 90 AEROSOL, METERED RESPIRATORY (INHALATION) at 08:11

## 2021-03-31 RX ADMIN — GUAIFENESIN 600 MG: 600 TABLET, EXTENDED RELEASE ORAL at 20:31

## 2021-03-31 RX ADMIN — BENZONATATE 200 MG: 100 CAPSULE ORAL at 08:04

## 2021-03-31 RX ADMIN — GUAIFENESIN 600 MG: 600 TABLET, EXTENDED RELEASE ORAL at 08:04

## 2021-03-31 RX ADMIN — DEXAMETHASONE SODIUM PHOSPHATE 6 MG: 4 INJECTION, SOLUTION INTRA-ARTICULAR; INTRALESIONAL; INTRAMUSCULAR; INTRAVENOUS; SOFT TISSUE at 09:02

## 2021-03-31 RX ADMIN — REMDESIVIR 100 MG: 100 INJECTION, POWDER, LYOPHILIZED, FOR SOLUTION INTRAVENOUS at 11:23

## 2021-03-31 NOTE — PLAN OF CARE
Problem: Adult Inpatient Plan of Care  Goal: Plan of Care Review  Outcome: Progressing  Flowsheets (Taken 3/31/2021 1413)  Progress: improving  Plan of Care Reviewed With: other (see comments)  Outcome Summary:   Per CPR, still on O2 via NC at 2 LPM, on day 3 of Remdesivir   will follow for VICKEY needs.

## 2021-03-31 NOTE — PROGRESS NOTES
Infectious Disease Progress Note    Patient Name: Telma Lane  MR#: [id number]  : 1965  Admission Date: 3/28/2021  Date: 21   Time: 12:00 PM   Author: ORLIN Hyde    Major Events:     Antibiotics:    Anti-infectives (From admission, onward)    Start     Dose/Rate Route Frequency Ordered Stop    21 1144  remdesivir (VEKLURY) 100 mg in sodium chloride 0.9 % 250 mL IVPB      100 mg  500 mL/hr over 30 Minutes intravenous Every 24 hours interval 21 1059 21 1144          Subjective   Patient states she is feeling a little better today, still with the coughing spells, tolerating diet, washed up today, sitting in the chair, denies any pain or significant sputum    Review of Systems    Pertinent items are noted in HPI.    Objective     Vital Signs:    Patient Vitals for the past 72 hrs:   BP Temp Temp src Pulse Resp SpO2 Height Weight   21 1123 113/64 36.7 °C (98.1 °F) Oral 72 18 96 % -- --   21 0800 131/66 36.7 °C (98.1 °F) Oral 62 18 99 % -- --   21 0400 123/64 36.8 °C (98.3 °F) Oral (!) 57 18 95 % -- --   21 0000 121/69 36.6 °C (97.9 °F) Oral 69 18 100 % -- --   21 2000 (!) 136/59 36.9 °C (98.5 °F) Oral 70 19 95 % -- --   21 1600 -- -- -- 88 -- -- -- --   21 1517 (!) 161/97 36.7 °C (98 °F) Oral 97 18 95 % -- --   21 1230 132/71 -- -- -- -- -- -- --   21 1215 128/67 -- -- -- -- -- -- --   21 1200 -- -- -- 95 -- -- -- --   21 1136 126/61 37.3 °C (99.1 °F) Oral 95 18 92 % -- --   21 0800 123/73 36.8 °C (98.3 °F) Oral 94 18 93 % -- --   21 0400 137/79 36.8 °C (98.2 °F) Oral 74 18 94 % -- --   21 0000 139/73 36.4 °C (97.6 °F) Oral 87 16 94 % -- --   21 2000 (!) 120/51 36.6 °C (97.9 °F) Oral 85 18 94 % -- --   21 1519 114/62 36.6 °C (97.9 °F) Oral 79 20 95 % -- --   21 1249 132/65 (!) 38.4 °C (101.2 °F) Oral 90 18 94 % -- --   21 1247 -- (!) 38.4 °C (101.2 °F) -- -- -- --  "-- --   21 1232 132/68 (!) 38.1 °C (100.6 °F) Oral 88 20 93 % -- --   21 1219 137/70 (!) 38.4 °C (101.2 °F) Oral 90 20 94 % -- --   21 1139 123/74 36.8 °C (98.2 °F) Oral 91 20 93 % -- --   21 0814 140/80 36.9 °C (98.4 °F) Oral 91 20 94 % -- --   21 0000 138/76 36.9 °C (98.4 °F) Oral 85 16 96 % -- --   21 2000 (!) 147/77 36.7 °C (98 °F) Oral 94 18 95 % -- --   21 1612 127/64 37.1 °C (98.8 °F) Oral 95 18 93 % -- --   21 1330 132/82 37.4 °C (99.4 °F) Oral 92 18 92 % 1.676 m (5' 5.98\") 102 kg (225 lb 9.6 oz)   21 1216 127/64 -- -- -- 18 92 % -- --       Temp (72hrs), Av.1 °C (98.8 °F), Min:36.4 °C (97.6 °F), Max:38.4 °C (101.2 °F)      Physical Exam:    Visit Vitals  /64 (BP Location: Left upper arm, Patient Position: Sitting)   Pulse 72   Temp 36.7 °C (98.1 °F) (Oral)   Resp 18   Ht 1.676 m (5' 5.98\")   Wt 102 kg (225 lb 9.6 oz)   SpO2 96%   BMI 36.43 kg/m²     General appearance: alert, appears stated age, cooperative and no distress  Eyes: anicteric  Lungs: diminished breath sounds bilateral and faint crackles at bases, nonlabored, dry hacking cough with deep inspiration, no wheezes or rhonchi  Abdomen: soft, ND, NT  Neurologic: Mental status: Alert, oriented, thought content appropriate    Lines, Drains, Airways, Wounds:  Peripheral IV 21 Right Antecubital (Active)   Number of days: 3       Labs:    CBC Results       21                    0608 0312 0847         WBC 5.04 6.54 5.00         RBC 4.49 4.45 5.13         HGB 13.0 13.2 14.9         HCT 39.0 39.1 46.8         MCV 86.9 87.9 91.2         MCH 29.0 29.7 29.0         MCHC 33.3 33.8 31.8          263 195                   BMP Results       21                    0608 0312 0847          138 134         K 4.4 5.2 3.7         Cl 104 102 100         CO2 23 26 22         Glucose 128 122 113         BUN 16 15 9         Creatinine 0.5 0.7 0.7  "        Calcium 8.5 8.6 8.5         Anion Gap 10 10 12         EGFR >60.0 >60.0 >60.0                   PT/PTT Results     No lab values to display.      UA Results       03/28/21                          1102           Color Yellow           Clarity Clear           Glucose Negative           Bilirubin Negative           Ketones +1           Sp Grav 1.020           Blood Negative           Ph 6.5           Protein Trace           Urobilinogen 0.2           Nitrite Negative           Leuk Est Negative           WBC 0 TO 3           RBC 0 TO 4           Bacteria None Seen           Comment for Ketones at 1102 on 03/28/21: Free sulfhydryl drugs such as Mesna, Capoten, and Acetylcysteine (Mucomyst) may cause false positive ketonuria.    Comment for Blood at 1102 on 03/28/21: The sensitivity of the occult blood test is equivalent to approximately 4 intact RBC/HPF.    Comment for Protein at 1102 on 03/28/21: Trace False Positive Protein can be seen with alkaline or highly buffered urines or urine with high specific gravity. Suggest clinical correlation.    Comment for Leuk Est at 1102 on 03/28/21: Results can be falsely negative due to high specific gravity, some antibiotics, glucose >3 g/dl, or WBC other than neutrophils.      Lactate Results     No lab values to display.          Microbiology Results     Procedure Component Value Units Date/Time    Blood Culture Blood, Venous [[id number]] Collected: 03/29/21 1440    Specimen: Blood, Venous Updated: 03/30/21 2001     Culture No growth at 18-24 hours    Blood Culture Blood, Venous [[id number]] Collected: 03/29/21 1440    Specimen: Blood, Venous Updated: 03/30/21 2001     Culture No growth at 18-24 hours    SARS-CoV-2 (COVID-19), PCR Nasopharynx [[id number]]  (Abnormal) Collected: 03/28/21 1005    Specimen: Nasopharyngeal Swab from Nasopharynx Updated: 03/28/21 1104    Narrative:      The following orders were created for panel order SARS-CoV-2 (COVID-19), PCR  Nasopharynx.  Procedure                               Abnormality         Status                     ---------                               -----------         ------                     SARS-COV-2 (COVID-19)/ F...[[id number]]  Abnormal            Final result                 Please view results for these tests on the individual orders.    SARS-COV-2 (COVID-19)/ FLU A/B, AND RSV, PCR Nasopharynx [[id number]]  (Abnormal) Collected: 03/28/21 1005    Specimen: Nasopharyngeal Swab from Nasopharynx Updated: 03/28/21 1104     SARS-CoV-2 (COVID-19) Positive     Influenza A Negative     Influenza B Negative     Respiratory Syncytial Virus Negative          Pathology Results     ** No results found for the last 720 hours. **          Echo:          Imaging:    Radiology Imaging   XR CHEST 1 VW    Narrative CLINICAL HISTORY: Fever    COMMENT: Single view chest      Impression IMPRESSION:    The lungs are hypoinflated.  The superior mediastinum and cardiac silhouette are  magnified due to technique.  There is consolidative opacity within the mid and  left lower lung with air bronchograms, in keeping with a pneumonia.  No  pneumothorax or vascular congestion.  PA and lateral radiographs are recommended  in 6-8 weeks to document resolution of imaging findings.       Assessment     Covid 19 PNA with improving hypoxic respiratory failure  - on 3 liters NC  - afebrile  - no leukocytosis  - on steroids and remdesivir  - renal and liver function is stable  - blood c/s sterile          Plan     Continue remdesivir therapy with daily labs, steroids and supportive care

## 2021-03-31 NOTE — PROGRESS NOTES
Hospital Medicine Service -  Daily Progress Note       SUBJECTIVE   Patient seen and examined earlier this morning.   Resting in chair  Feeling a bit better each day  Continues to have MATIAS and cough  No CP  No additional complaint at this time  Improving. In good spirits     OBJECTIVE      Vital signs in last 24 hours:  Temp:  [36.6 °C (97.9 °F)-36.9 °C (98.5 °F)] 36.7 °C (98.1 °F)  Heart Rate:  [57-97] 74  Resp:  [18-19] 18  BP: (108-161)/(57-97) 108/57    PHYSICAL EXAMINATION        General: no acute distress  HEENT:  PERRL, anicteric sclera   Neck: supple, no JVD  Cardiac: RRR, +S1/S2, no murmur, no rub   Lungs: coarse bilaterally, no wheezing   Abdomen: soft, NT/ND, +BS, no rebound/guarding   Extremities: no edema, distal perfusion intact  Neuro: AAOx3, nonfocal.   Skin: no rash. Clean, dry, intact.   Psych: cooperative     LABS / IMAGING / TELE      Labs (personally reviewed):  Results from last 7 days   Lab Units 03/31/21  0608   SODIUM mEQ/L 137   POTASSIUM mEQ/L 4.4   CHLORIDE mEQ/L 104   CO2 mEQ/L 23   BUN mg/dL 16   CREATININE mg/dL 0.5*   GLUCOSE mg/dL 128*   CALCIUM mg/dL 8.5*       Results from last 7 days   Lab Units 03/28/21  1005   MAGNESIUM mg/dL 1.6*    Results from last 7 days   Lab Units 03/31/21  0608   WBC K/uL 5.04   HEMOGLOBIN g/dL 13.0   HEMATOCRIT % 39.0   PLATELETS K/uL 310                Microbiology Data (personally reviewed):  Microbiology Results     Procedure Component Value Units Date/Time    Blood Culture Blood, Venous [[id number]] Collected: 03/29/21 1440    Specimen: Blood, Venous Updated: 03/30/21 2001     Culture No growth at 18-24 hours    Blood Culture Blood, Venous [[id number]] Collected: 03/29/21 1440    Specimen: Blood, Venous Updated: 03/30/21 2001     Culture No growth at 18-24 hours    SARS-CoV-2 (COVID-19), PCR Nasopharynx [[id number]]  (Abnormal) Collected: 03/28/21 1005    Specimen: Nasopharyngeal Swab from Nasopharynx Updated: 03/28/21 1104    Narrative:      The  following orders were created for panel order SARS-CoV-2 (COVID-19), PCR Nasopharynx.  Procedure                               Abnormality         Status                     ---------                               -----------         ------                     SARS-COV-2 (COVID-19)/ F...[[id number]]  Abnormal            Final result                 Please view results for these tests on the individual orders.    SARS-COV-2 (COVID-19)/ FLU A/B, AND RSV, PCR Nasopharynx [[id number]]  (Abnormal) Collected: 03/28/21 1005    Specimen: Nasopharyngeal Swab from Nasopharynx Updated: 03/28/21 1104     SARS-CoV-2 (COVID-19) Positive     Influenza A Negative     Influenza B Negative     Respiratory Syncytial Virus Negative          ECG/Telemetry  Telemetry reviewed    ASSESSMENT AND PLAN      * Pneumonia due to COVID-19 virus  Assessment & Plan  + COVID testing with some imaging changes on CXR  Required 4L nc now improved to 2L nc  Decadron started in the ED  ID following; on remdesivir  Continue supportive care  Wean O2 as tolerated  Modified proning   Incentive spirometry  Improving clinically    Acute respiratory failure with hypoxia (CMS/HCC)  Assessment & Plan  2/2 COVID PNA  Cont supplemental oxygen, wean as able  Home O2 eval prior to discharge    Hyperkalemia  Assessment & Plan  Mild hyperkalemia  K 5.2  Changed diet to low K diet  Gently hydrated  Started bowel regimen.  Resolved.  Monitor BMP    Type 2 diabetes mellitus without complication, without long-term current use of insulin (CMS/HCC)  Assessment & Plan  Usually on metformin at home  Hold while inpatient  Accucheck and SSI    Hyperlipidemia  Assessment & Plan  Atorvastatin    Benign essential HTN  Assessment & Plan  Continue metoprolol per home regimen         VTE Assessment: Padua    VTE Prophylaxis Plan: Current anticoagulant orders:              enoxaparin (LOVENOX) syringe 40 mg  Daily (6p)       Code Status: Full Code    Estimated discharge date: 4/1/2021        Delvis Uriarte,   3/31/2021  3:00 PM

## 2021-03-31 NOTE — PATIENT CARE CONFERENCE
Care Progression Rounds Note  Date: 3/31/2021  Time: 10:59 AM     Patient Name: Telma Lane     Medical Record Number: [id number]   YOB: 1965  Sex: Female      Room/Bed: 4507W    Admitting Diagnosis: Hypoxia [R09.02]  Pneumonia of left lower lobe due to infectious organism [J18.9]  Pneumonia due to COVID-19 virus [U07.1, J12.82]  2019 novel coronavirus-infected pneumonia (NCIP) [U07.1, J12.82]   Admit Date/Time: 3/28/2021  9:04 AM    Primary Diagnosis: Pneumonia due to COVID-19 virus  Principal Problem: Pneumonia due to COVID-19 virus    GMLOS: 5.4  Anticipated Discharge Date: 4/1/2021    AM-PAC  Mobility Score:      Discharge Planning:  Living Arrangements: house  Anticipated Discharge Disposition: home without services    Barriers to Discharge:  Barriers to Discharge: Medical issues not resolved  Comment: OraliaL NC, decadron/remdesivir    Participants:  , nursing, dietitian/nutrition services, social work/services

## 2021-04-01 LAB
ALBUMIN SERPL-MCNC: 3.3 G/DL (ref 3.4–5)
ALP SERPL-CCNC: 80 IU/L (ref 35–126)
ALT SERPL-CCNC: 37 IU/L (ref 11–54)
ANION GAP SERPL CALC-SCNC: 11 MEQ/L (ref 3–15)
AST SERPL-CCNC: 36 IU/L (ref 15–41)
BASOPHILS # BLD: 0 K/UL (ref 0.01–0.1)
BASOPHILS NFR BLD: 0 %
BILIRUB SERPL-MCNC: 0.5 MG/DL (ref 0.3–1.2)
BUN SERPL-MCNC: 16 MG/DL (ref 8–20)
CALCIUM SERPL-MCNC: 8.5 MG/DL (ref 8.9–10.3)
CHLORIDE SERPL-SCNC: 101 MEQ/L (ref 98–109)
CK SERPL-CCNC: 349 U/L (ref 15–200)
CO2 SERPL-SCNC: 25 MEQ/L (ref 22–32)
CREAT SERPL-MCNC: 0.5 MG/DL (ref 0.6–1.1)
CRP SERPL-MCNC: 22.5 MG/L
DIFFERENTIAL METHOD BLD: ABNORMAL
EOSINOPHIL # BLD: 0.07 K/UL (ref 0.04–0.36)
EOSINOPHIL NFR BLD: 1 %
ERYTHROCYTE [DISTWIDTH] IN BLOOD BY AUTOMATED COUNT: 13.8 % (ref 11.7–14.4)
FERRITIN SERPL-MCNC: 300 NG/ML (ref 11–250)
GFR SERPL CREATININE-BSD FRML MDRD: >60 ML/MIN/1.73M*2
GLUCOSE BLD-MCNC: 115 MG/DL (ref 70–99)
GLUCOSE BLD-MCNC: 119 MG/DL (ref 70–99)
GLUCOSE BLD-MCNC: 153 MG/DL (ref 70–99)
GLUCOSE BLD-MCNC: 171 MG/DL (ref 70–99)
GLUCOSE BLD-MCNC: 263 MG/DL (ref 70–99)
GLUCOSE SERPL-MCNC: 133 MG/DL (ref 70–99)
HCT VFR BLDCO AUTO: 39.6 % (ref 35–45)
HGB BLD-MCNC: 13.3 G/DL (ref 11.8–15.7)
LDH SERPL L TO P-CCNC: 350 IU/L (ref 98–192)
LYMPHOCYTES # BLD: 1.14 K/UL (ref 1.2–3.5)
LYMPHOCYTES NFR BLD: 17 %
MCH RBC QN AUTO: 29.4 PG (ref 28–33.2)
MCHC RBC AUTO-ENTMCNC: 33.6 G/DL (ref 32.2–35.5)
MCV RBC AUTO: 87.4 FL (ref 83–98)
MONOCYTES # BLD: 0.61 K/UL (ref 0.28–0.8)
MONOCYTES NFR BLD: 9 %
NEUTS BAND # BLD: 0.07 K/UL (ref 0–0.53)
NEUTS BAND # BLD: 4.44 K/UL (ref 1.7–7)
NEUTS BAND NFR BLD: 1 %
NEUTS SEG NFR BLD: 66 %
PDW BLD AUTO: 10.8 FL (ref 9.4–12.3)
PLAT MORPH BLD: NORMAL
PLATELET # BLD AUTO: 381 K/UL (ref 150–369)
PLATELET # BLD EST: ABNORMAL 10*3/UL
POCT TEST: ABNORMAL
POTASSIUM SERPL-SCNC: 4.4 MEQ/L (ref 3.6–5.1)
PROT SERPL-MCNC: 7.1 G/DL (ref 6–8.2)
RBC # BLD AUTO: 4.53 M/UL (ref 3.93–5.22)
SODIUM SERPL-SCNC: 137 MEQ/L (ref 136–144)
VARIANT LYMPHS # BLD MANUAL: 0.4 K/UL
VARIANT LYMPHS NFR BLD: 6 %
WBC # BLD AUTO: 6.73 K/UL (ref 3.8–10.5)

## 2021-04-01 PROCEDURE — 63600000 HC DRUGS/DETAIL CODE: Performed by: HOSPITALIST

## 2021-04-01 PROCEDURE — 63700000 HC SELF-ADMINISTRABLE DRUG: Performed by: HOSPITALIST

## 2021-04-01 PROCEDURE — 85025 COMPLETE CBC W/AUTO DIFF WBC: CPT | Performed by: HOSPITALIST

## 2021-04-01 PROCEDURE — 99232 SBSQ HOSP IP/OBS MODERATE 35: CPT | Mod: CR | Performed by: HOSPITALIST

## 2021-04-01 PROCEDURE — 80053 COMPREHEN METABOLIC PANEL: CPT | Performed by: HOSPITALIST

## 2021-04-01 PROCEDURE — 83615 LACTATE (LD) (LDH) ENZYME: CPT | Performed by: HOSPITALIST

## 2021-04-01 PROCEDURE — 82728 ASSAY OF FERRITIN: CPT | Performed by: HOSPITALIST

## 2021-04-01 PROCEDURE — 86140 C-REACTIVE PROTEIN: CPT | Performed by: HOSPITALIST

## 2021-04-01 PROCEDURE — 36415 COLL VENOUS BLD VENIPUNCTURE: CPT | Performed by: HOSPITALIST

## 2021-04-01 PROCEDURE — 25000000 HC PHARMACY GENERAL: Performed by: INTERNAL MEDICINE

## 2021-04-01 PROCEDURE — 20600000 HC ROOM AND CARE INTERMEDIATE/TELEMETRY

## 2021-04-01 PROCEDURE — 82550 ASSAY OF CK (CPK): CPT | Performed by: HOSPITALIST

## 2021-04-01 PROCEDURE — 25800000 HC PHARMACY IV SOLUTIONS: Performed by: INTERNAL MEDICINE

## 2021-04-01 RX ORDER — CALCIUM CARBONATE 200(500)MG
500 TABLET,CHEWABLE ORAL 2 TIMES DAILY PRN
Status: DISCONTINUED | OUTPATIENT
Start: 2021-04-01 | End: 2021-04-03 | Stop reason: HOSPADM

## 2021-04-01 RX ADMIN — REMDESIVIR 100 MG: 100 INJECTION, POWDER, LYOPHILIZED, FOR SOLUTION INTRAVENOUS at 11:41

## 2021-04-01 RX ADMIN — GUAIFENESIN AND CODEINE PHOSPHATE 5 ML: 100; 10 SOLUTION ORAL at 09:05

## 2021-04-01 RX ADMIN — DEXAMETHASONE SODIUM PHOSPHATE 6 MG: 4 INJECTION, SOLUTION INTRA-ARTICULAR; INTRALESIONAL; INTRAMUSCULAR; INTRAVENOUS; SOFT TISSUE at 09:05

## 2021-04-01 RX ADMIN — INSULIN ASPART 3 UNITS: 100 INJECTION, SOLUTION INTRAVENOUS; SUBCUTANEOUS at 17:15

## 2021-04-01 RX ADMIN — BENZONATATE 200 MG: 100 CAPSULE ORAL at 12:02

## 2021-04-01 RX ADMIN — GUAIFENESIN AND CODEINE PHOSPHATE 5 ML: 100; 10 SOLUTION ORAL at 18:09

## 2021-04-01 RX ADMIN — GUAIFENESIN AND CODEINE PHOSPHATE 5 ML: 100; 10 SOLUTION ORAL at 14:11

## 2021-04-01 RX ADMIN — METOPROLOL SUCCINATE 50 MG: 50 TABLET, EXTENDED RELEASE ORAL at 09:05

## 2021-04-01 RX ADMIN — ENOXAPARIN SODIUM 40 MG: 40 INJECTION SUBCUTANEOUS at 17:15

## 2021-04-01 RX ADMIN — GUAIFENESIN 600 MG: 600 TABLET, EXTENDED RELEASE ORAL at 19:47

## 2021-04-01 RX ADMIN — POLYETHYLENE GLYCOL 3350 17 G: 17 POWDER, FOR SOLUTION ORAL at 09:05

## 2021-04-01 RX ADMIN — GUAIFENESIN 600 MG: 600 TABLET, EXTENDED RELEASE ORAL at 09:05

## 2021-04-01 RX ADMIN — ANTACID TABLETS 500 MG: 500 TABLET, CHEWABLE ORAL at 15:35

## 2021-04-01 NOTE — PATIENT CARE CONFERENCE
Care Progression Rounds Note  Date: 4/1/2021  Time: 11:55 AM     Patient Name: Telma Lane     Medical Record Number: [id number]   YOB: 1965  Sex: Female      Room/Bed: 4507W    Admitting Diagnosis: Hypoxia [R09.02]  Pneumonia of left lower lobe due to infectious organism [J18.9]  Pneumonia due to COVID-19 virus [U07.1, J12.82]  2019 novel coronavirus-infected pneumonia (NCIP) [U07.1, J12.82]   Admit Date/Time: 3/28/2021  9:04 AM    Primary Diagnosis: Pneumonia due to COVID-19 virus  Principal Problem: Pneumonia due to COVID-19 virus    GMLOS: 5.4  Anticipated Discharge Date: 4/1/2021    AM-PAC  Mobility Score:      Discharge Planning:  Living Arrangements: house  Anticipated Discharge Disposition: home without services    Barriers to Discharge:  Barriers to Discharge: Medical issues not resolved  Comment: 3L NC- attempt to wean, decadron/remdesivir, IVF, ?d/c saturday    Participants:  , nursing, social work/services

## 2021-04-01 NOTE — PROGRESS NOTES
Infectious Disease Progress Note    Patient Name: Telma Lane  MR#: [id number]  : 1965  Admission Date: 3/28/2021  Date: 21   Time: 12:09 PM   Author: ORLIN Hyde    Major Events:     Antibiotics:    Anti-infectives (From admission, onward)    Start     Dose/Rate Route Frequency Ordered Stop    21 1144  remdesivir (VEKLURY) 100 mg in sodium chloride 0.9 % 250 mL IVPB      100 mg  500 mL/hr over 30 Minutes intravenous Every 24 hours interval 21 1059 21 1144          Subjective   Patient states she is feeling a little better, still has a cough but no sputum production, admits to cold like feeling in her anterior upper chest with inspiration and coughing, ambulating in the room to bathroom and washed up, states her breathing feels ok until she starts coughing, tolerating diet, d/w nursing     Review of Systems    Pertinent items are noted in HPI.    Objective     Vital Signs:    Patient Vitals for the past 72 hrs:   BP Temp Temp src Pulse Resp SpO2   21 1151 125/68 -- -- 74 18 97 %   21 0856 122/61 36.9 °C (98.4 °F) Oral 78 18 93 %   21 0800 -- -- -- 78 -- --   21 0618 140/72 36.7 °C (98 °F) Oral 80 20 95 %   21 0400 130/70 36.6 °C (97.9 °F) Oral 96 20 93 %   21 0000 136/77 36.7 °C (98 °F) Oral 66 18 95 %   21 2000 127/72 36.6 °C (97.8 °F) Oral 80 18 95 %   21 1500 114/68 36.6 °C (97.8 °F) Oral 81 18 95 %   21 1153 (!) 108/57 36.7 °C (98.1 °F) Oral 74 18 95 %   21 1138 123/70 36.7 °C (98.1 °F) Oral 78 18 96 %   21 1123 113/64 36.7 °C (98.1 °F) Oral 72 18 96 %   21 0800 131/66 36.7 °C (98.1 °F) Oral 62 18 99 %   21 0400 123/64 36.8 °C (98.3 °F) Oral (!) 57 18 95 %   21 0000 121/69 36.6 °C (97.9 °F) Oral 69 18 100 %   21 2000 (!) 136/59 36.9 °C (98.5 °F) Oral 70 19 95 %   21 1600 -- -- -- 88 -- --   21 1517 (!) 161/97 36.7 °C (98 °F) Oral 97 18 95 %   21 1230 132/71  "-- -- -- -- --   21 1215 128/67 -- -- -- -- --   21 1200 -- -- -- 95 -- --   21 1136 126/61 37.3 °C (99.1 °F) Oral 95 18 92 %   21 0800 123/73 36.8 °C (98.3 °F) Oral 94 18 93 %   21 0400 137/79 36.8 °C (98.2 °F) Oral 74 18 94 %   21 0000 139/73 36.4 °C (97.6 °F) Oral 87 16 94 %   21 2000 (!) 120/51 36.6 °C (97.9 °F) Oral 85 18 94 %   21 1519 114/62 36.6 °C (97.9 °F) Oral 79 20 95 %   21 1249 132/65 (!) 38.4 °C (101.2 °F) Oral 90 18 94 %   21 1247 -- (!) 38.4 °C (101.2 °F) -- -- -- --   21 1232 132/68 (!) 38.1 °C (100.6 °F) Oral 88 20 93 %   21 1219 137/70 (!) 38.4 °C (101.2 °F) Oral 90 20 94 %       Temp (72hrs), Av °C (98.6 °F), Min:36.4 °C (97.6 °F), Max:38.4 °C (101.2 °F)      Physical Exam:    Visit Vitals  /68 (BP Location: Right upper arm, Patient Position: Lying)   Pulse 74   Temp 36.9 °C (98.4 °F) (Oral)   Resp 18   Ht 1.676 m (5' 5.98\")   Wt 102 kg (225 lb 9.6 oz)   SpO2 97%   BMI 36.43 kg/m²     General appearance: alert, appears stated age, cooperative and no distress  Eyes: anicteric  Lungs: diminished bilatrerally with faint crackles, nonlabored, intermittent dry hacking cough observed  Abdomen: soft, ND, NT  Neurologic: Mental status: Alert, oriented, thought content appropriate    Lines, Drains, Airways, Wounds:  Peripheral IV 21 Right Antecubital (Active)   Number of days: 4       Labs:    CBC Results       21                    0430 0608 0312         WBC 6.73 5.04 6.54         RBC 4.53 4.49 4.45         HGB 13.3 13.0 13.2         HCT 39.6 39.0 39.1         MCV 87.4 86.9 87.9         MCH 29.4 29.0 29.7         MCHC 33.6 33.3 33.8          310 263                   BMP Results       21                    0430 0608 0312          137 138         K 4.4 4.4 5.2         Cl 101 104 102         CO2 25 23 26         Glucose 133 128 122         BUN 16 16 15      "    Creatinine 0.5 0.5 0.7         Calcium 8.5 8.5 8.6         Anion Gap 11 10 10         EGFR >60.0 >60.0 >60.0                   PT/PTT Results     No lab values to display.      UA Results       03/28/21                          1102           Color Yellow           Clarity Clear           Glucose Negative           Bilirubin Negative           Ketones +1           Sp Grav 1.020           Blood Negative           Ph 6.5           Protein Trace           Urobilinogen 0.2           Nitrite Negative           Leuk Est Negative           WBC 0 TO 3           RBC 0 TO 4           Bacteria None Seen           Comment for Ketones at 1102 on 03/28/21: Free sulfhydryl drugs such as Mesna, Capoten, and Acetylcysteine (Mucomyst) may cause false positive ketonuria.    Comment for Blood at 1102 on 03/28/21: The sensitivity of the occult blood test is equivalent to approximately 4 intact RBC/HPF.    Comment for Protein at 1102 on 03/28/21: Trace False Positive Protein can be seen with alkaline or highly buffered urines or urine with high specific gravity. Suggest clinical correlation.    Comment for Leuk Est at 1102 on 03/28/21: Results can be falsely negative due to high specific gravity, some antibiotics, glucose >3 g/dl, or WBC other than neutrophils.      Lactate Results     No lab values to display.          Microbiology Results     Procedure Component Value Units Date/Time    Blood Culture Blood, Venous [[id number]] Collected: 03/29/21 1440    Specimen: Blood, Venous Updated: 03/31/21 2001     Culture No growth at 48 hours    Blood Culture Blood, Venous [[id number]] Collected: 03/29/21 1440    Specimen: Blood, Venous Updated: 03/31/21 2001     Culture No growth at 48 hours    SARS-CoV-2 (COVID-19), PCR Nasopharynx [[id number]]  (Abnormal) Collected: 03/28/21 1005    Specimen: Nasopharyngeal Swab from Nasopharynx Updated: 03/28/21 1104    Narrative:      The following orders were created for panel order SARS-CoV-2 (COVID-19),  PCR Nasopharynx.  Procedure                               Abnormality         Status                     ---------                               -----------         ------                     SARS-COV-2 (COVID-19)/ F...[[id number]]  Abnormal            Final result                 Please view results for these tests on the individual orders.    SARS-COV-2 (COVID-19)/ FLU A/B, AND RSV, PCR Nasopharynx [[id number]]  (Abnormal) Collected: 03/28/21 1005    Specimen: Nasopharyngeal Swab from Nasopharynx Updated: 03/28/21 1104     SARS-CoV-2 (COVID-19) Positive     Influenza A Negative     Influenza B Negative     Respiratory Syncytial Virus Negative          Pathology Results     ** No results found for the last 720 hours. **          Echo:          Imaging:    Radiology Imaging   XR CHEST 1 VW    Narrative CLINICAL HISTORY: Fever    COMMENT: Single view chest      Impression IMPRESSION:    The lungs are hypoinflated.  The superior mediastinum and cardiac silhouette are  magnified due to technique.  There is consolidative opacity within the mid and  left lower lung with air bronchograms, in keeping with a pneumonia.  No  pneumothorax or vascular congestion.  PA and lateral radiographs are recommended  in 6-8 weeks to document resolution of imaging findings.       Assessment     Covid 19 PNA with improving hypoxic respiratory failure  - on 2 liters NC  - afebrile  - no leukocytosis  - on steroids and remdesivir  - renal and liver function is stable  - blood c/s sterile             Plan     Continue remdesivir therapy with daily labs, steroids and supportive care

## 2021-04-01 NOTE — PROGRESS NOTES
Hospital Medicine Service -  Daily Progress Note       SUBJECTIVE   Patient seen and examined earlier this morning.   Resting in chair  Feeling a bit better each day  Continues to have MATIAS and cough  No CP  No additional complaint at this time  Improving. In good spirits     OBJECTIVE      Vital signs in last 24 hours:  Temp:  [36.6 °C (97.8 °F)-36.9 °C (98.4 °F)] 36.9 °C (98.4 °F)  Heart Rate:  [66-96] 74  Resp:  [18-20] 18  BP: (114-140)/(61-77) 125/68    PHYSICAL EXAMINATION        General: no acute distress  HEENT:  PERRL, anicteric sclera   Neck: supple, no JVD  Cardiac: RRR, +S1/S2, no murmur, no rub   Lungs: coarse bilaterally, no wheezing   Abdomen: soft, NT/ND, +BS, no rebound/guarding   Extremities: no edema, distal perfusion intact  Neuro: AAOx3, nonfocal.   Skin: no rash. Clean, dry, intact.   Psych: cooperative     LABS / IMAGING / TELE      Labs (personally reviewed):  Results from last 7 days   Lab Units 04/01/21  0430   SODIUM mEQ/L 137   POTASSIUM mEQ/L 4.4   CHLORIDE mEQ/L 101   CO2 mEQ/L 25   BUN mg/dL 16   CREATININE mg/dL 0.5*   GLUCOSE mg/dL 133*   CALCIUM mg/dL 8.5*       Results from last 7 days   Lab Units 03/28/21  1005   MAGNESIUM mg/dL 1.6*    Results from last 7 days   Lab Units 04/01/21  0430   WBC K/uL 6.73   HEMOGLOBIN g/dL 13.3   HEMATOCRIT % 39.6   PLATELETS K/uL 381*                Microbiology Data (personally reviewed):  Microbiology Results     Procedure Component Value Units Date/Time    Blood Culture Blood, Venous [[id number]] Collected: 03/29/21 1440    Specimen: Blood, Venous Updated: 03/31/21 2001     Culture No growth at 48 hours    Blood Culture Blood, Venous [[id number]] Collected: 03/29/21 1440    Specimen: Blood, Venous Updated: 03/31/21 2001     Culture No growth at 48 hours    SARS-CoV-2 (COVID-19), PCR Nasopharynx [[id number]]  (Abnormal) Collected: 03/28/21 1005    Specimen: Nasopharyngeal Swab from Nasopharynx Updated: 03/28/21 1104    Narrative:      The  following orders were created for panel order SARS-CoV-2 (COVID-19), PCR Nasopharynx.  Procedure                               Abnormality         Status                     ---------                               -----------         ------                     SARS-COV-2 (COVID-19)/ F...[[id number]]  Abnormal            Final result                 Please view results for these tests on the individual orders.    SARS-COV-2 (COVID-19)/ FLU A/B, AND RSV, PCR Nasopharynx [[id number]]  (Abnormal) Collected: 03/28/21 1005    Specimen: Nasopharyngeal Swab from Nasopharynx Updated: 03/28/21 1104     SARS-CoV-2 (COVID-19) Positive     Influenza A Negative     Influenza B Negative     Respiratory Syncytial Virus Negative        ECG/Telemetry  Telemetry reviewed    ASSESSMENT AND PLAN      * Pneumonia due to COVID-19 virus  Assessment & Plan  + COVID testing with some imaging changes on CXR  Required 4L nc now improved to 3L nc  Decadron started in the ED  ID following; on remdesivir  Continue supportive care  Wean O2 as tolerated  Modified proning   Incentive spirometry  Improving clinically    Acute respiratory failure with hypoxia (CMS/HCC)  Assessment & Plan  2/2 COVID PNA  Cont supplemental oxygen, wean as able  Home O2 eval prior to discharge    Hyperkalemia  Assessment & Plan  Mild hyperkalemia  K 5.2 has now normalized.  Changed diet to low K diet  Gently hydrated  Started bowel regimen.  Resolved.  Monitor BMP    Type 2 diabetes mellitus without complication, without long-term current use of insulin (CMS/HCC)  Assessment & Plan  Usually on metformin at home  Hold while inpatient  Accucheck and SSI    Hyperlipidemia  Assessment & Plan  Atorvastatin    Benign essential HTN  Assessment & Plan  Continue metoprolol per home regimen       VTE Assessment: Padua    VTE Prophylaxis Plan: Current anticoagulant orders:              enoxaparin (LOVENOX) syringe 40 mg  Daily (6p)        Code Status: Full Code    Estimated discharge  date: 4/2/2021       Delvis Uriarte DO  4/1/2021  1:16 PM

## 2021-04-01 NOTE — PLAN OF CARE
Problem: Adult Inpatient Plan of Care  Goal: Plan of Care Review  Flowsheets (Taken 4/1/2021 5401)  Outcome Summary: Discussed in rounds: O2-3L, IV remdesivir day 3, IV steroids, ID following, attempting to wean from O2

## 2021-04-01 NOTE — NURSING NOTE
Pt put call bell on stating she feels off, that maybe her blood sugar is low. VS as recorded in chart, blood sugar 113. Pt was given a cool rag and ice water and reports feeling better.

## 2021-04-02 LAB
ALBUMIN SERPL-MCNC: 2.9 G/DL (ref 3.4–5)
ALP SERPL-CCNC: 79 IU/L (ref 35–126)
ALT SERPL-CCNC: 32 IU/L (ref 11–54)
ANION GAP SERPL CALC-SCNC: 11 MEQ/L (ref 3–15)
AST SERPL-CCNC: 27 IU/L (ref 15–41)
BASOPHILS # BLD: 0 K/UL (ref 0.01–0.1)
BASOPHILS NFR BLD: 0 %
BILIRUB SERPL-MCNC: 0.5 MG/DL (ref 0.3–1.2)
BUN SERPL-MCNC: 14 MG/DL (ref 8–20)
CALCIUM SERPL-MCNC: 8.2 MG/DL (ref 8.9–10.3)
CHLORIDE SERPL-SCNC: 103 MEQ/L (ref 98–109)
CO2 SERPL-SCNC: 23 MEQ/L (ref 22–32)
CREAT SERPL-MCNC: 0.5 MG/DL (ref 0.6–1.1)
DIFFERENTIAL METHOD BLD: ABNORMAL
EOSINOPHIL # BLD: 0.14 K/UL (ref 0.04–0.36)
EOSINOPHIL NFR BLD: 2 %
ERYTHROCYTE [DISTWIDTH] IN BLOOD BY AUTOMATED COUNT: 13.7 % (ref 11.7–14.4)
GFR SERPL CREATININE-BSD FRML MDRD: >60 ML/MIN/1.73M*2
GLUCOSE BLD-MCNC: 109 MG/DL (ref 70–99)
GLUCOSE BLD-MCNC: 141 MG/DL (ref 70–99)
GLUCOSE BLD-MCNC: 150 MG/DL (ref 70–99)
GLUCOSE BLD-MCNC: 151 MG/DL (ref 70–99)
GLUCOSE BLD-MCNC: 219 MG/DL (ref 70–99)
GLUCOSE SERPL-MCNC: 128 MG/DL (ref 70–99)
HCT VFR BLDCO AUTO: 37.2 % (ref 35–45)
HGB BLD-MCNC: 12.4 G/DL (ref 11.8–15.7)
HYPOCHROMIA BLD QL SMEAR: ABNORMAL
LYMPHOCYTES # BLD: 1.85 K/UL (ref 1.2–3.5)
LYMPHOCYTES NFR BLD: 27 %
MCH RBC QN AUTO: 29 PG (ref 28–33.2)
MCHC RBC AUTO-ENTMCNC: 33.3 G/DL (ref 32.2–35.5)
MCV RBC AUTO: 87.1 FL (ref 83–98)
MONOCYTES # BLD: 0.75 K/UL (ref 0.28–0.8)
MONOCYTES NFR BLD: 11 %
NEUTS BAND # BLD: 0.07 K/UL (ref 0–0.53)
NEUTS BAND # BLD: 4.04 K/UL (ref 1.7–7)
NEUTS BAND NFR BLD: 1 %
NEUTS SEG NFR BLD: 59 %
PDW BLD AUTO: 10.7 FL (ref 9.4–12.3)
PLAT MORPH BLD: NORMAL
PLATELET # BLD AUTO: 367 K/UL (ref 150–369)
PLATELET # BLD EST: ABNORMAL 10*3/UL
POCT TEST: ABNORMAL
POTASSIUM SERPL-SCNC: 4.1 MEQ/L (ref 3.6–5.1)
PROT SERPL-MCNC: 6.2 G/DL (ref 6–8.2)
RBC # BLD AUTO: 4.27 M/UL (ref 3.93–5.22)
SODIUM SERPL-SCNC: 137 MEQ/L (ref 136–144)
WBC # BLD AUTO: 6.84 K/UL (ref 3.8–10.5)

## 2021-04-02 PROCEDURE — 63700000 HC SELF-ADMINISTRABLE DRUG: Performed by: HOSPITALIST

## 2021-04-02 PROCEDURE — 99232 SBSQ HOSP IP/OBS MODERATE 35: CPT | Mod: CR | Performed by: HOSPITALIST

## 2021-04-02 PROCEDURE — 20600000 HC ROOM AND CARE INTERMEDIATE/TELEMETRY

## 2021-04-02 PROCEDURE — 25000000 HC PHARMACY GENERAL: Performed by: INTERNAL MEDICINE

## 2021-04-02 PROCEDURE — 80053 COMPREHEN METABOLIC PANEL: CPT | Performed by: HOSPITALIST

## 2021-04-02 PROCEDURE — 25800000 HC PHARMACY IV SOLUTIONS: Performed by: INTERNAL MEDICINE

## 2021-04-02 PROCEDURE — 85025 COMPLETE CBC W/AUTO DIFF WBC: CPT | Performed by: HOSPITALIST

## 2021-04-02 PROCEDURE — 63600000 HC DRUGS/DETAIL CODE: Performed by: HOSPITALIST

## 2021-04-02 RX ADMIN — GUAIFENESIN AND CODEINE PHOSPHATE 5 ML: 100; 10 SOLUTION ORAL at 17:21

## 2021-04-02 RX ADMIN — DEXAMETHASONE SODIUM PHOSPHATE 6 MG: 4 INJECTION, SOLUTION INTRA-ARTICULAR; INTRALESIONAL; INTRAMUSCULAR; INTRAVENOUS; SOFT TISSUE at 09:07

## 2021-04-02 RX ADMIN — GUAIFENESIN 600 MG: 600 TABLET, EXTENDED RELEASE ORAL at 20:47

## 2021-04-02 RX ADMIN — ENOXAPARIN SODIUM 40 MG: 40 INJECTION SUBCUTANEOUS at 17:09

## 2021-04-02 RX ADMIN — METOPROLOL SUCCINATE 50 MG: 50 TABLET, EXTENDED RELEASE ORAL at 08:48

## 2021-04-02 RX ADMIN — GUAIFENESIN 600 MG: 600 TABLET, EXTENDED RELEASE ORAL at 08:48

## 2021-04-02 RX ADMIN — REMDESIVIR 100 MG: 100 INJECTION, POWDER, LYOPHILIZED, FOR SOLUTION INTRAVENOUS at 12:13

## 2021-04-02 RX ADMIN — INSULIN ASPART 3 UNITS: 100 INJECTION, SOLUTION INTRAVENOUS; SUBCUTANEOUS at 17:08

## 2021-04-02 NOTE — NURSING NOTE
Received pt AAOx3. Maintained on 3L O2 via nasal cannula. Frequent dry, nonproductive cough... robitussin administered per orders & MATIAS noted. Decadron & Remdesivir continued per orders. Insulin coverage per sliding scale. Independent in self care and ambulation. Call bell within reach. Will continue to monitor.

## 2021-04-02 NOTE — PATIENT CARE CONFERENCE
Care Progression Rounds Note  Date: 4/2/2021  Time: 11:59 AM     Patient Name: Telma Lane     Medical Record Number: [id number]   YOB: 1965  Sex: Female      Room/Bed: 4507W    Admitting Diagnosis: Hypoxia [R09.02]  Pneumonia of left lower lobe due to infectious organism [J18.9]  Pneumonia due to COVID-19 virus [U07.1, J12.82]  2019 novel coronavirus-infected pneumonia (NCIP) [U07.1, J12.82]   Admit Date/Time: 3/28/2021  9:04 AM    Primary Diagnosis: Pneumonia due to COVID-19 virus  Principal Problem: Pneumonia due to COVID-19 virus    GMLOS: 5.4  Anticipated Discharge Date: 4/2/2021    AM-PAC  Mobility Score:      Discharge Planning:  Living Arrangements: house  Anticipated Discharge Disposition: home without services    Barriers to Discharge:  Barriers to Discharge: Medical issues not resolved  Comment: 2L, dry cough, remdesivir, possible d/c tomorrow    Participants:  , nursing, social work/services

## 2021-04-02 NOTE — CONSULTS
Nutrition Assessment  Reason for consult: LOS, COVID+    Recommendations:  1. Continue with 2 gm K+, No Concentrated Sweets   2. Weekly weights  3. Recommend daily MVI supplement     Monitor:  1. %PO intake  2. Diet tolerance  3. Weight changes  4. Skin integrity  5.  Monitor lytes/labs, replete PRN  6. Bowel fx    Goals:   1. To consume and tolerate >/= 75% of estimated needs  2. Consider supplements if po intake <50%  3. Labs and electrolytes remain WDL    Subjective:  Per [facility] COVID pt visitation restrictions, RD not able to enter room. RD spoke with patient via telephone. Patient tolerating diet without difficulty. Patient eats 2-3 meals day. Occasionally has HS snack(crackers, PB) provided by nursing. Pt reports no issues with appetite/weight prior to admission.        Clinical Course: Patient is a 56 y.o. female who was admitted on 3/28/2021 with a diagnosis of Hypoxia [R09.02]  Pneumonia of left lower lobe due to infectious organism [J18.9]  Pneumonia due to COVID-19 virus [U07.1, J12.82]  2019 novel coronavirus-infected pneumonia (NCIP) [U07.1, J12.82].   -IV remdesivir day 3, IV steroids, ID following, attempting to wean from O2  -tolerating diet.  Mild hyperkalemia  K 5.2 has now normalized.  Changed diet to low K diet  Feeling a bit better each day  Continues to have MATIAS and cough  No CP  No additional complaint at this time  Improving. In good spirits  Extremities: no edema, distal perfusion intact  PMH-DM; Usually on metformin at home. Hold while inpatient  Accucheck and SSI    Nutrition Focused Physical Exam:  Per [facility] COVID pt visitation restrictions, RD not able to enter room.        Dietary Orders   (From admission, onward)             Start     Ordered    03/30/21 0737  Adult Diet Regular; 2gm Potassium; No Concentrated Sweets; RD/LDN may adjust order  Diet effective now     Question Answer Comment   Diet Texture Regular    Renal Restrictions: 2gm Potassium    Other Restriction(s): No Concentrated  "Sweets    Delegation of Authority. Diet orders written by PA/Candie may not be adjusted by RD/LDNs. RD/LDN may adjust order        03/30/21 0737              Wt Readings from Last 3 Encounters:   03/28/21 102 kg (225 lb 9.6 oz)     Weights (last 7 days)     Date/Time   Weight    03/28/21 1330   102 kg (225 lb 9.6 oz)    03/28/21 0924   108 kg (238 lb)              Past Medical History:   Diagnosis Date   • Hypertension    • Lipid disorder    • Type 2 diabetes mellitus (CMS/HCC)      History reviewed. No pertinent surgical history.         [facility] Nutrition Screen Tool  Has patient lost weight without trying?: 0-->No  If yes,how much weight has been lost?: 0-->Patient has not lost weight  Has patient been eating poorly due to decreased appetite?: 0-->No  [facility] Nutrition Screen Score: 0    Nutrition/Diet History  Intake (%): 100%    Physical Findings  Last Bowel Movement: 04/01/21    Last Bowel Movement: 04/01/21         Anthropometrics  Height: 167.6 cm (5' 5.98\")         Current Weight  Weight Method: Stated  Weight: 102 kg (225 lb 9.6 oz)    Ideal Body Weight (IBW)  Ideal Body Weight (IBW) (kg): 59.54  % Ideal Body Weight: 171.87         Body Mass Index (BMI)  BMI (Calculated): 36.4  BMI (kg/m2): 36.51         Results from last 7 days   Lab Units 04/02/21 0443 04/01/21  0430 03/31/21  0608   SODIUM mEQ/L 137 137 137   POTASSIUM mEQ/L 4.1 4.4 4.4   CHLORIDE mEQ/L 103 101 104   CO2 mEQ/L 23 25 23   BUN mg/dL 14 16 16   CREATININE mg/dL 0.5* 0.5* 0.5*   GLUCOSE mg/dL 128* 133* 128*   CALCIUM mg/dL 8.2* 8.5* 8.5*      Results from last 7 days   Lab Units 04/02/21 0443 04/01/21  0430 03/31/21  0608   ALK PHOS IU/L 79 80 88   BILIRUBIN TOTAL mg/dL 0.5 0.5 0.7   ALBUMIN g/dL 2.9* 3.3* 3.2*   ALT IU/L 32 37 37   AST IU/L 27 36 44*          No results found for: HGBA1C  No results found for: FRKMSXSB34  Lab Results   Component Value Date    CALCIUM 8.2 (L) 04/02/2021     Results from last 7 days   Lab Units 04/02/21  0443 " 04/01/21  0430 03/31/21  0608   WBC K/uL 6.84 6.73 5.04   HEMOGLOBIN g/dL 12.4 13.3 13.0   HEMATOCRIT % 37.2 39.6 39.0   PLATELETS K/uL 367 381* 310               Results from last 7 days   Lab Units 03/28/21  1005   MAGNESIUM mg/dL 1.6*         • dexamethasone  6 mg intravenous q24h INT   • enoxaparin  40 mg subcutaneous Daily (6p)   • guaiFENesin  600 mg oral BID   • insulin aspart U-100  3-9 Units subcutaneous With meals & nightly   • metoprolol succinate XL  50 mg oral Daily   • remdesivir (VEKLURY) IVPB  100 mg intravenous q24h INT            Fluid Requirements  Partha-Vivian Method (over 20 kg): 3546.62        Discussed with: Patient    Date: 04/02/21  Signature: MEE Anna, MEE

## 2021-04-02 NOTE — PLAN OF CARE
Problem: Adult Inpatient Plan of Care  Goal: Plan of Care Review  Flowsheets (Taken 4/2/2021 1300)  Progress: improving  Outcome Summary: Pt discussed in Care Progression Rounds. Pt's O2 was weaned to Room Air. Continues IV Remdesivir and IV Decadron. Pt may need a Home O2 eval upon discharge. CC will continue to follow.

## 2021-04-02 NOTE — PROGRESS NOTES
Hospital Medicine Service -  Daily Progress Note       SUBJECTIVE   Patient seen and examined earlier this morning.   Feeling a bit better each day  Remains on 3L nc  No CP  No additional complaint at this time  Improving. In good spirits     OBJECTIVE      Vital signs in last 24 hours:  Temp:  [36.5 °C (97.7 °F)-36.9 °C (98.4 °F)] 36.6 °C (97.9 °F)  Heart Rate:  [54-83] 72  Resp:  [18] 18  BP: (118-143)/(64-84) 118/74    PHYSICAL EXAMINATION        General: no acute distress  HEENT:  PERRL, anicteric sclera   Neck: supple, no JVD  Cardiac: RRR, +S1/S2, no murmur, no rub   Lungs: coarse bilaterally, no wheezing   Abdomen: soft, NT/ND, +BS, no rebound/guarding   Extremities: no edema, distal perfusion intact  Neuro: AAOx3, nonfocal.   Skin: no rash. Clean, dry, intact.   Psych: cooperative     LABS / IMAGING / TELE      Labs (personally reviewed):  Results from last 7 days   Lab Units 04/02/21  0443   SODIUM mEQ/L 137   POTASSIUM mEQ/L 4.1   CHLORIDE mEQ/L 103   CO2 mEQ/L 23   BUN mg/dL 14   CREATININE mg/dL 0.5*   GLUCOSE mg/dL 128*   CALCIUM mg/dL 8.2*       Results from last 7 days   Lab Units 03/28/21  1005   MAGNESIUM mg/dL 1.6*    Results from last 7 days   Lab Units 04/02/21  0443   WBC K/uL 6.84   HEMOGLOBIN g/dL 12.4   HEMATOCRIT % 37.2   PLATELETS K/uL 367                Microbiology Data (personally reviewed):  Microbiology Results     Procedure Component Value Units Date/Time    Blood Culture Blood, Venous [[id number]] Collected: 03/29/21 1440    Specimen: Blood, Venous Updated: 04/01/21 2001     Culture No growth at 72 hours    Blood Culture Blood, Venous [[id number]] Collected: 03/29/21 1440    Specimen: Blood, Venous Updated: 04/01/21 2001     Culture No growth at 72 hours    SARS-CoV-2 (COVID-19), PCR Nasopharynx [[id number]]  (Abnormal) Collected: 03/28/21 1005    Specimen: Nasopharyngeal Swab from Nasopharynx Updated: 03/28/21 1104    Narrative:      The following orders were created for panel order  SARS-CoV-2 (COVID-19), PCR Nasopharynx.  Procedure                               Abnormality         Status                     ---------                               -----------         ------                     SARS-COV-2 (COVID-19)/ F...[[id number]]  Abnormal            Final result                 Please view results for these tests on the individual orders.    SARS-COV-2 (COVID-19)/ FLU A/B, AND RSV, PCR Nasopharynx [[id number]]  (Abnormal) Collected: 03/28/21 1005    Specimen: Nasopharyngeal Swab from Nasopharynx Updated: 03/28/21 1104     SARS-CoV-2 (COVID-19) Positive     Influenza A Negative     Influenza B Negative     Respiratory Syncytial Virus Negative          ECG/Telemetry  Telemetry reviewed    ASSESSMENT AND PLAN      * Pneumonia due to COVID-19 virus  Assessment & Plan  + COVID testing with some imaging changes on CXR  Required 4L nc now improved to 3L nc  Decadron started in the ED  ID following; on remdesivir  Continue supportive care  Wean O2 as tolerated  Modified proning   Incentive spirometry  Improving clinically    Acute respiratory failure with hypoxia (CMS/HCC)  Assessment & Plan  2/2 COVID PNA  Cont supplemental oxygen, wean as able  Home O2 eval prior to discharge    Hyperkalemia  Assessment & Plan  Mild hyperkalemia  K 5.2 has now normalized.  Changed diet to low K diet  Gently hydrated  Started bowel regimen.  Resolved.  Monitor BMP    Type 2 diabetes mellitus without complication, without long-term current use of insulin (CMS/HCC)  Assessment & Plan  Usually on metformin at home  Hold while inpatient  Accucheck and SSI    Hyperlipidemia  Assessment & Plan  Atorvastatin    Benign essential HTN  Assessment & Plan  Continue metoprolol per home regimen       VTE Assessment: Padua    VTE Prophylaxis Plan: Current anticoagulant orders:              enoxaparin (LOVENOX) syringe 40 mg  Daily (6p)       Code Status: Full Code    Estimated discharge date: 4/3/2021       Delvis Uriarte,  DO  4/2/2021  3:36 PM

## 2021-04-03 VITALS
WEIGHT: 225.6 LBS | HEART RATE: 61 BPM | DIASTOLIC BLOOD PRESSURE: 73 MMHG | BODY MASS INDEX: 36.26 KG/M2 | RESPIRATION RATE: 18 BRPM | OXYGEN SATURATION: 94 % | TEMPERATURE: 98 F | HEIGHT: 66 IN | SYSTOLIC BLOOD PRESSURE: 141 MMHG

## 2021-04-03 LAB
BACTERIA BLD CULT: NORMAL
BACTERIA BLD CULT: NORMAL
GLUCOSE BLD-MCNC: 119 MG/DL (ref 70–99)
GLUCOSE BLD-MCNC: 167 MG/DL (ref 70–99)
POCT TEST: ABNORMAL
POCT TEST: ABNORMAL

## 2021-04-03 PROCEDURE — 63700000 HC SELF-ADMINISTRABLE DRUG: Performed by: HOSPITALIST

## 2021-04-03 PROCEDURE — 94761 N-INVAS EAR/PLS OXIMETRY MLT: CPT

## 2021-04-03 PROCEDURE — 99239 HOSP IP/OBS DSCHRG MGMT >30: CPT | Mod: CR | Performed by: HOSPITALIST

## 2021-04-03 PROCEDURE — 63600000 HC DRUGS/DETAIL CODE: Performed by: HOSPITALIST

## 2021-04-03 RX ORDER — ALBUTEROL SULFATE 90 UG/1
2 INHALANT RESPIRATORY (INHALATION) EVERY 6 HOURS PRN
Qty: 18 G | Refills: 1 | Status: SHIPPED | OUTPATIENT
Start: 2021-04-03 | End: 2022-07-13

## 2021-04-03 RX ORDER — POLYETHYLENE GLYCOL 3350 17 G/17G
17 POWDER, FOR SOLUTION ORAL DAILY PRN
Qty: 15 PACKET | Refills: 0 | Status: SHIPPED | OUTPATIENT
Start: 2021-04-03 | End: 2022-07-13

## 2021-04-03 RX ADMIN — GUAIFENESIN 600 MG: 600 TABLET, EXTENDED RELEASE ORAL at 08:52

## 2021-04-03 RX ADMIN — METOPROLOL SUCCINATE 50 MG: 50 TABLET, EXTENDED RELEASE ORAL at 08:52

## 2021-04-03 RX ADMIN — DEXAMETHASONE SODIUM PHOSPHATE 6 MG: 4 INJECTION, SOLUTION INTRA-ARTICULAR; INTRALESIONAL; INTRAMUSCULAR; INTRAVENOUS; SOFT TISSUE at 08:52

## 2021-04-03 NOTE — DISCHARGE SUMMARY
[facility] DISCHARGE SUMMARY       BRIEF OVERVIEW   Admitting Provider: Jonna Quintana MD  Attending Provider: Delvis Uriarte DO Attending phys phone: [phone]    PCP: Suzie Burrell CRNP [phone]    Admission Date: 3/28/2021  Discharge Date: 4/3/2021    Discharge To: home     DISCHARGE DIAGNOSES      Primary Discharge Diagnosis  Pneumonia due to COVID-19 virus    Secondary Discharge Diagnoses  Active Hospital Problems    Diagnosis Date Noted   • Pneumonia due to COVID-19 virus 03/28/2021     Priority: High   • Acute respiratory failure with hypoxia (CMS/HCC) 03/28/2021     Priority: Medium   • Hyperkalemia 03/30/2021   • Benign essential HTN 03/28/2021   • Hyperlipidemia 03/28/2021   • Type 2 diabetes mellitus without complication, without long-term current use of insulin (CMS/HCC) 03/28/2021      Resolved Hospital Problems   No resolved problems to display.     SUMMARY OF HOSPITALIZATION      Presenting Problem/History of Present Illness  Hypoxia [R09.02]  Pneumonia of left lower lobe due to infectious organism [J18.9]  Pneumonia due to COVID-19 virus [U07.1, J12.82]  2019 novel coronavirus-infected pneumonia (NCIP) [U07.1, J12.82]    This is a 56 y.o. year-old female admitted on 3/28/2021 with Hypoxia [R09.02]  Pneumonia of left lower lobe due to infectious organism [J18.9]  Pneumonia due to COVID-19 virus [U07.1, J12.82]  2019 novel coronavirus-infected pneumonia (NCIP) [U07.1, J12.82].  See admit H and P and ED documentation for further details.     Hospital Course  See below Assessment and Plan for further details to the hospitalization. See consult notes for additional details.     Assessment and Plan:  * Pneumonia due to COVID-19 virus  Assessment & Plan  + COVID testing with some imaging changes on CXR  Required 4L nc now improved to RA  Decadron started in the ED  ID following; completed remdesivir  Continue supportive care  Wean O2 as tolerated - passed home O2 evaluation.   Much  improved clinically.  Ms. Lane feels much better. She is ambulating and talkative and is in good spirits. Afebrile.   Oxygenation stable on RA ambulation.  She would like to be discharged home. She has a pulse ox at home and will continue to monitor herself closely and will follow up with her PCP.    Acute respiratory failure with hypoxia (CMS/HCC)  Assessment & Plan  2/2 COVID PNA  As stated above  Home O2 eval prior to discharge  Resolved    Hyperkalemia  Assessment & Plan  Mild hyperkalemia  K 5.2 has now normalized.  Continue low K diet  Gently hydrated  Started bowel regimen.  Resolved.  Monitor BMP - follow up repeat labs with PCP within the week    Type 2 diabetes mellitus without complication, without long-term current use of insulin (CMS/HCC)  Assessment & Plan  Usually on metformin at home  Hold while inpatient  Accucheck and SSI  Resume home med regimen on discharge.    Hyperlipidemia  Assessment & Plan  Atorvastatin    Benign essential HTN  Assessment & Plan  Continue metoprolol per home regimen          Exam on Day of Discharge  General: no acute distress  HEENT:  PERRL, anicteric sclera   Neck: supple, no JVD  Cardiac: RRR, +S1/S2, no murmur, no rub   Lungs: clear bilaterally, no wheezing   Abdomen: soft, NT/ND, +BS, no rebound/guarding   Extremities: no edema, distal perfusion intact  Neuro: AAOx3, nonfocal.   Skin: no rash. Clean, dry, intact.   Psych: cooperative        Consults During Admission  IP CONSULT TO INFECTIOUS DISEASE      DISCHARGE MEDICATIONS       Medication List      START taking these medications    albuterol HFA 90 mcg/actuation inhaler  Commonly known as: VENTOLIN HFA  Inhale 2 puffs every 6 (six) hours as needed for wheezing or shortness of breath.  Dose: 2 puff     polyethylene glycol 17 gram packet  Commonly known as: MIRALAX  Take 17 g by mouth daily as needed (constipation).  Dose: 17 g        CONTINUE taking these medications    ascorbic acid (vitamin C) 1,000 mg  tablet  Commonly known as: VITAMIN C  Take 1,000 mg by mouth daily.  Dose: 1,000 mg     atorvastatin 40 mg tablet  Commonly known as: LIPITOR  Take 20 mg by mouth daily.  Dose: 20 mg     cetirizine 10 mg tablet  Commonly known as: ZyrTEC  Take 10 mg by mouth daily.  Dose: 10 mg     cholecalciferol 400 unit (10 mcg) tablet tablet  Take 400 Units by mouth daily.  Dose: 400 Units  Generic drug: cholecalciferol (vitamin D3)     coenzyme Q10 200 mg capsule  Commonly known as: COQ10  Take 200 mg by mouth daily.  Dose: 200 mg     metFORMIN 500 mg tablet  Commonly known as: GLUCOPHAGE  Take 1 tablet by mouth every morning.  Dose: 1 tablet     metoprolol succinate XL 50 mg 24 hr tablet  Commonly known as: TOPROL-XL  Take 1 tablet by mouth daily.  Dose: 1 tablet     multivitamin tablet  Take 1 tablet by mouth daily.  Dose: 1 tablet     promethazine-DM 6.25-15 mg/5 mL syrup  Commonly known as: PROMETHAZINE-DM  Take 5 mL by mouth every 6 (six) hours as needed.  Dose: 5 mL        STOP taking these medications    azithromycin 250 mg tablet  Commonly known as: ZITHROMAX     ibuprofen 200 mg tablet  Commonly known as: MOTRIN              PROCEDURES / LABS / IMAGING          Pertinent Imaging  X-ray Chest 1 View    Result Date: 3/28/2021  IMPRESSION: The lungs are hypoinflated.  The superior mediastinum and cardiac silhouette are magnified due to technique.  There is consolidative opacity within the mid and left lower lung with air bronchograms, in keeping with a pneumonia.  No pneumothorax or vascular congestion.  PA and lateral radiographs are recommended in 6-8 weeks to document resolution of imaging findings.      OUTPATIENT  FOLLOW-UP / REFERRALS / PENDING TESTS          Test Results Pending at Discharge  Unresulted Labs (From admission, onward)    None          Important Issues to Address in Follow-Up  Follow up/Discharge instructions discussed with the patient and/or power of  at the time of discharge include but are  not limited to:      Continue to self quarantine per the CDC guidelines as we have discussed.  Continue to monitor your pulse oxygen levels at home.   If you have persisting low oxygen levels below 90%, or if you have shortness of breath, or fever, or any concerning symptoms, return to the ED.  -Follow up with your primary care physician within one week.  -Check repeat labwork; CBC, CMP, Magnesium level, when you see your primary care physician this week.  -Follow up on hospitalization imaging findings: repeat chest xray in 6 weeks.   -If you have chest pain, shortness of breath, fever, uncontrolled vomiting or diarrhea, severe abdominal pain, light headedness, passing out, vision change, numbness/tingling/weakness, or any additional concerning symptoms as discussed, return to the Emergency Department.     DISCHARGE DISPOSITION      45 minutes spent on patient care and coordination of today's discharge. This includes discussing the discharge plan, discharge medications, and follow up instructions with the patient and/or power of , as well as coordinating care with nursing and consulting physicians.     Discharge to: home    Code Status At Discharge: Full Code    Physician Order for Life-Sustaining Treatment Document Status      No documents found

## 2021-04-03 NOTE — NURSING NOTE
Home Oxygen Qualification Protocol    Findings:  Room Air SpO2 % at rest: 95%  Room Air SpO2 % with ambulation: 90 %       Conclusion:  Meets criteria for home oxygen: No       Oxygen Recommendation:             The information above was collected from the most recent home oxygen qualification protocol performed on the patient.

## 2022-07-13 ENCOUNTER — OFFICE VISIT (OUTPATIENT)
Dept: OBSTETRICS AND GYNECOLOGY | Facility: CLINIC | Age: 57
End: 2022-07-13
Payer: COMMERCIAL

## 2022-07-13 VITALS
DIASTOLIC BLOOD PRESSURE: 88 MMHG | BODY MASS INDEX: 39.05 KG/M2 | HEIGHT: 66 IN | WEIGHT: 243 LBS | SYSTOLIC BLOOD PRESSURE: 120 MMHG

## 2022-07-13 DIAGNOSIS — Z01.419 ENCOUNTER FOR ANNUAL ROUTINE GYNECOLOGICAL EXAMINATION: Primary | ICD-10-CM

## 2022-07-13 DIAGNOSIS — Z12.31 ENCOUNTER FOR SCREENING MAMMOGRAM FOR MALIGNANT NEOPLASM OF BREAST: ICD-10-CM

## 2022-07-13 DIAGNOSIS — R21 SKIN RASH: ICD-10-CM

## 2022-07-13 DIAGNOSIS — Z12.4 CERVICAL CANCER SCREENING: ICD-10-CM

## 2022-07-13 DIAGNOSIS — N95.1 HOT FLASHES DUE TO MENOPAUSE: ICD-10-CM

## 2022-07-13 DIAGNOSIS — Z80.3 FAMILY HISTORY OF BREAST CANCER: ICD-10-CM

## 2022-07-13 PROCEDURE — 99386 PREV VISIT NEW AGE 40-64: CPT | Performed by: STUDENT IN AN ORGANIZED HEALTH CARE EDUCATION/TRAINING PROGRAM

## 2022-07-13 PROCEDURE — 3008F BODY MASS INDEX DOCD: CPT | Performed by: STUDENT IN AN ORGANIZED HEALTH CARE EDUCATION/TRAINING PROGRAM

## 2022-07-13 RX ORDER — TRIAMCINOLONE ACETONIDE 1 MG/G
1 OINTMENT TOPICAL 2 TIMES DAILY
Qty: 30 G | Refills: 1 | Status: SHIPPED | OUTPATIENT
Start: 2022-07-13 | End: 2023-01-26 | Stop reason: ALTCHOICE

## 2022-07-13 RX ORDER — METOPROLOL SUCCINATE 25 MG/1
12 TABLET, EXTENDED RELEASE ORAL
COMMUNITY

## 2022-07-13 RX ORDER — PAROXETINE 7.5 MG/1
7.5 CAPSULE ORAL DAILY
Qty: 90 CAPSULE | Refills: 1 | Status: SHIPPED | OUTPATIENT
Start: 2022-07-13 | End: 2022-07-14

## 2022-07-13 RX ORDER — PHENTERMINE HYDROCHLORIDE 37.5 MG/1
37.5 CAPSULE ORAL EVERY MORNING
COMMUNITY
End: 2024-10-02 | Stop reason: ALTCHOICE

## 2022-07-13 NOTE — ASSESSMENT & PLAN NOTE
Worsening recently  Not a good candidate for hormone replacement, HTN  Plan to trial Paroxetine  Follow up 1-2 months

## 2022-07-13 NOTE — PROGRESS NOTES
Chief complaint: annual exam, new patient    Assessment/Plan     57 y.o.  with normal annual gynecologic examination.    Problem List Items Addressed This Visit        Circulatory    Hot flashes due to menopause     Worsening recently  Not a good candidate for hormone replacement, HTN  Plan to trial Paroxetine  Follow up 1-2 months           Relevant Medications    PARoxetine mesylate,menop.sym, 7.5 mg capsule    triamcinolone (KENALOG) 0.1 % ointment       Other    Family history of breast cancer     Sister in her late 60s  Uncertain if genetic testing  Aware available if needed             Other Visit Diagnoses     Encounter for annual routine gynecological examination    -  Primary    Encounter for screening mammogram for malignant neoplasm of breast        Relevant Orders    BI SCREENING MAMMOGRAM BILATERAL(TOMOSYNTHESIS)    Cervical cancer screening        Relevant Orders    Cytology, Thinprep Pap    Skin rash        Suspect moist dermatitis - reviewed methods drying area, Rx sent    Relevant Medications    triamcinolone (KENALOG) 0.1 % ointment        Normal findings on routine gynecologic exam today.  Reviewed annual clinical breast exam and annual screening mammogram.  Discussed ASCCP guidelines and Pap smear with cotesting frequency, collected today.   STD/STI testing offered, declined.  Encouraged daily calcium and vitamin D intake was well as weight bearing exercise daily for bone health.    Follow up PRN or for annual exam.  --------------------------------------------------------  HPI    Karime Lane is a 57 y.o. female  No LMP recorded. Patient is postmenopausal. who presents for annual examination.    Concerns today: rash on butt, itching  Hot flashes worsening, night sweats    New updates: working with PCP for weight loss, has lost 13 lb  Doing well with gym here at [facility]  Previous Dr. Montiel patient    GYN History  Menarche age 12.  No LMP recorded. Patient is postmenopausal.  LMP age  49  Menstrual status: Postmenopausal    The patient is not currently sexually active.   Current sexual partner(s): , relationship doing well, 's health [facility]  Domestic violence: no.     History of STD: no  Interested in STD screening today? no  Concerns about sex: none    Health maintenance  No history abnormal Pap smears  No history of cryotherapy LEEP, cold knife cone of the cervix  Last Pap smear  NILM HPV neg    Regular self breast exam: no  History of abnormal mammogram: no  Last mammogram: per patient 10/2021 normal    Family history of breast cancer: yes - sister, paternal aunt  Family history of uterine or ovarian cancer: no    History of abnormal lipids: yes - statin  HPV vaccination?: n/a    Last colonoscopy: did cologard, due   Bone density scan: n/a     Occupation: lunch lady    OB History    Para Term  AB Living   3 3 2 1 0 2   SAB IAB Ectopic Multiple Live Births           2      # Outcome Date GA Lbr Tucker/2nd Weight Sex Delivery Anes PTL Lv   3   30w0d   M CS-Unspec   DARLENE   2 Term     F Vag-Spont   FD   1 Term     F Vag-Spont   DARLENE     # 1 - Date: , Sex: Female, Weight: None, GA: None, Delivery: Vaginal, Spontaneous, Apgar1: None, Apgar5: None, Living: Living, Birth Comments: None    # 2 - Date: , Sex: Female, Weight: None, GA: None, Delivery: Vaginal, Spontaneous, Apgar1: None, Apgar5: None, Living: Fetal Demise, Birth Comments: None    # 3 - Date: , Sex: Male, Weight: None, GA: 30w0d, Delivery: , Unspecified, Apgar1: None, Apgar5: None, Living: Living, Birth Comments: None    History Rh sensitization, 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 No  "pertinent family history.    Social History     Substance and Sexual Activity   Alcohol Use Not Currently     Social History     Substance and Sexual Activity   Drug Use Never     Social History     Tobacco Use   Smoking Status Never Smoker   Smokeless Tobacco Never Used       Current Outpatient Medications:   •  ascorbic acid, vitamin C, (VITAMIN C) 1,000 mg tablet, Take 1,000 mg by mouth daily., Disp: , Rfl:   •  atorvastatin (LIPITOR) 40 mg tablet, Take 20 mg by mouth daily., Disp: , Rfl:   •  cetirizine (ZyrTEC) 10 mg tablet, Take 10 mg by mouth daily., Disp: , Rfl:   •  metFORMIN (GLUCOPHAGE) 500 mg tablet, Take 1 tablet by mouth every morning., Disp: , Rfl:   •  metoprolol succinate XL (TOPROL-XL) 25 mg 24 hr tablet, 12 mg., Disp: , Rfl:   •  multivitamin tablet, Take 1 tablet by mouth daily., Disp: , Rfl:   •  PARoxetine mesylate,menop.sym, 7.5 mg capsule, Take 7.5 mg by mouth daily for 90 doses., Disp: 90 capsule, Rfl: 1  •  phentermine 37.5 mg capsule, Take 37.5 mg by mouth every morning., Disp: , Rfl:   •  triamcinolone (KENALOG) 0.1 % ointment, Apply 1 application topically 2 (two) times a day., Disp: 30 g, Rfl: 1  •  cholecalciferol, vitamin D3, (cholecalciferol) 400 unit (10 mcg) tablet tablet, Take 400 Units by mouth daily., Disp: , Rfl:   Allergies   Allergen Reactions   • Penicillins Other (see comments)     Pt turned blue   • Tetracycline Angioedema   • Latex      REVIEW OF SYSTEMS  GYN: No postmenopasual bleeding. No involuntary urine loss. No vaginal dryness  Remainder of ROS negative    Objective   Vitals: Blood pressure 120/88, height 1.676 m (5' 6\"), weight 110 kg (243 lb). Body mass index is 39.22 kg/m².    Physical Exam  Constitutional:       Appearance: Normal appearance.   Genitourinary:      Vagina and uterus normal.      Cervix is parous.      No cervical motion tenderness or friability.      Uterus is anteverted and regular.     External female genitalia normal.   Vagina normal.   Uterus " is normal. Uterine contour is regular. Uterus is anteverted.   Adnexa normal. HENT:      Head: Normocephalic and atraumatic.      Mouth/Throat:      Mouth: Mucous membranes are moist.   Neck:      Thyroid: No thyroid mass or thyromegaly.   Cardiovascular:      Rate and Rhythm: Normal rate.      Heart sounds: Normal heart sounds.   Pulmonary:      Effort: Pulmonary effort is normal.      Breath sounds: Normal breath sounds.   Chest:   Breasts:      Right: Normal. No bleeding, inverted nipple, mass, nipple discharge, skin change or axillary adenopathy.      Left: Normal. No bleeding, inverted nipple, mass, nipple discharge, skin change or axillary adenopathy.       Abdominal:      General: A surgical scar is present.      Palpations: Abdomen is soft.      Tenderness: There is no abdominal tenderness.      Comments: Well-healed Pfannenstiel incision   Musculoskeletal:         General: Normal range of motion.      Right lower leg: No edema.      Left lower leg: No edema.   Lymphadenopathy:      Upper Body:      Right upper body: No axillary adenopathy.      Left upper body: No axillary adenopathy.   Neurological:      General: No focal deficit present.      Mental Status: She is alert.   Skin:     General: Skin is warm and dry.   Psychiatric:         Mood and Affect: Mood normal.         Behavior: Behavior normal.   Exam conducted with a chaperone present.

## 2022-07-14 ENCOUNTER — TELEPHONE (OUTPATIENT)
Dept: OBSTETRICS AND GYNECOLOGY | Facility: CLINIC | Age: 57
End: 2022-07-14
Payer: COMMERCIAL

## 2022-07-14 DIAGNOSIS — N95.1 HOT FLASHES DUE TO MENOPAUSE: Primary | ICD-10-CM

## 2022-07-14 RX ORDER — PAROXETINE 10 MG/1
10 TABLET, FILM COATED ORAL DAILY
Qty: 90 TABLET | Refills: 3 | Status: SHIPPED | OUTPATIENT
Start: 2022-07-14 | End: 2023-01-26 | Stop reason: ALTCHOICE

## 2022-07-14 NOTE — TELEPHONE ENCOUNTER
pts insurance does not cover paroxetine mesylate.  Please re order paroxetine hydrochloride.  Please advise

## 2022-07-15 LAB
CLINICAL INFO: NORMAL
CYTO CVX: NORMAL
CYTOLOGIST CVX/VAG CYTO: NORMAL
CYTOLOGY CMNT CVX/VAG CYTO-IMP: NORMAL
DATE OF PREVIOUS PAP: NORMAL
DATE PREVIOUS BX: NORMAL
HPV E6+E7 MRNA CVX QL NAA+PROBE: NOT DETECTED
LMP START DATE: NORMAL
QUEST COMMENT (PAP): NORMAL
SPECIMEN SOURCE CVX/VAG CYTO: NORMAL
STAT OF ADQ CVX/VAG CYTO-IMP: NORMAL

## 2023-01-26 ENCOUNTER — APPOINTMENT (OUTPATIENT)
Dept: RADIOLOGY | Age: 58
End: 2023-01-26
Attending: INTERNAL MEDICINE
Payer: COMMERCIAL

## 2023-01-26 ENCOUNTER — HOSPITAL ENCOUNTER (OUTPATIENT)
Facility: CLINIC | Age: 58
Discharge: HOME | End: 2023-01-26
Attending: INTERNAL MEDICINE
Payer: COMMERCIAL

## 2023-01-26 VITALS
OXYGEN SATURATION: 97 % | RESPIRATION RATE: 16 BRPM | DIASTOLIC BLOOD PRESSURE: 88 MMHG | HEART RATE: 93 BPM | TEMPERATURE: 97.8 F | SYSTOLIC BLOOD PRESSURE: 135 MMHG

## 2023-01-26 DIAGNOSIS — M54.9 DORSAL BACK PAIN: Primary | ICD-10-CM

## 2023-01-26 LAB
BACTERIA, POC: 0
BILIRUBIN, POC: NEGATIVE
BILIRUBIN, POC: NEGATIVE
BLOOD URINE, POC: NEGATIVE
BLOOD URINE, POC: NORMAL
CLARITY, POC: CLEAR
CLARITY, POC: NORMAL
COLOR, POC: YELLOW
COLOR, POC: YELLOW
EXPIRATION DATE: NORMAL
GLUCOSE URINE, POC: NEGATIVE
GLUCOSE URINE, POC: NEGATIVE
KETONES, POC: NEGATIVE
KETONES, POC: NORMAL
LEUKOCYTE EST, POC: NEGATIVE
LEUKOCYTE EST, POC: NEGATIVE
Lab: NORMAL
NITRITE, POC: NORMAL
NITRITE, POC: NORMAL
PH, POC: 6
PH, POC: 6
POCT MANUFACTURER: NORMAL
PREGNANCY TEST URINE, POC: NEGATIVE
PROTEIN, POC: NORMAL
PROTEIN, POC: NORMAL
SPECIFIC GRAVITY, POC: 1.02
SPECIFIC GRAVITY, POC: 1.02
UROBILINOGEN, POC: 0.2
UROBILINOGEN, POC: 0.2

## 2023-01-26 PROCEDURE — 81025 URINE PREGNANCY TEST: CPT | Performed by: INTERNAL MEDICINE

## 2023-01-26 PROCEDURE — 81002 URINALYSIS NONAUTO W/O SCOPE: CPT | Performed by: INTERNAL MEDICINE

## 2023-01-26 PROCEDURE — 71046 X-RAY EXAM CHEST 2 VIEWS: CPT | Performed by: INTERNAL MEDICINE

## 2023-01-26 PROCEDURE — 99203 OFFICE O/P NEW LOW 30 MIN: CPT | Performed by: INTERNAL MEDICINE

## 2023-01-26 RX ORDER — PREDNISONE 20 MG/1
TABLET ORAL
Qty: 12 TABLET | Refills: 0 | Status: SHIPPED | OUTPATIENT
Start: 2023-01-26 | End: 2023-07-06

## 2023-01-26 RX ORDER — CYCLOBENZAPRINE HCL 10 MG
10 TABLET ORAL 3 TIMES DAILY PRN
Qty: 20 TABLET | Refills: 0 | Status: SHIPPED | OUTPATIENT
Start: 2023-01-26 | End: 2023-07-06

## 2023-01-26 RX ORDER — LIDOCAINE 560 MG/1
1 PATCH PERCUTANEOUS; TOPICAL; TRANSDERMAL DAILY
Qty: 30 PATCH | Refills: 0 | Status: SHIPPED | OUTPATIENT
Start: 2023-01-26 | End: 2023-07-06

## 2023-01-26 ASSESSMENT — ENCOUNTER SYMPTOMS
DIARRHEA: 0
WHEEZING: 1
DYSURIA: 0
COUGH: 1
SORE THROAT: 0
FEVER: 0
BACK PAIN: 1
ABDOMINAL SWELLING: 0
SHORTNESS OF BREATH: 0
ABDOMINAL PAIN: 0
VOMITING: 0
HEMATURIA: 0
BOWEL INCONTINENCE: 0

## 2023-01-26 NOTE — ED PROVIDER NOTES
"History  Chief Complaint   Patient presents with   • Back Pain     RT side mid back pain. Since this AM  Cough x 2 weeks , causing the back pain  Urine odor     Cough/cold last week  No covid testing done  Coughing \"Air\"    Allergies; PCN, tetracycline      History provided by:  Patient   used: No    Back Pain  Location:  Thoracic spine (lower thoracic back)  Onset quality:  Sudden  Chronicity:  New  Context: recent illness    Context: not falling, not MCA and not MVA    Relieved by:  None tried (benadryl at night)  Worsened by:  Standing, sitting and deep breathing  Associated symptoms: no abdominal pain, no abdominal swelling, no bladder incontinence, no bowel incontinence, no dysuria and no fever    Cough  Cough characteristics:  Productive  Sputum characteristics: thick.  Duration:  1 week  Associated symptoms: wheezing    Associated symptoms: no ear pain, no fever, no rash, no shortness of breath and no sore throat    Risk factors: recent infection        Past Medical History:   Diagnosis Date   • Hypertension    • Lipid disorder    • Type 2 diabetes mellitus (CMS/HCC)        Past Surgical History:   Procedure Laterality Date   •  SECTION         No family history on file.    Social History     Tobacco Use   • Smoking status: Never   • Smokeless tobacco: Never   Substance Use Topics   • Alcohol use: Not Currently   • Drug use: Never       Review of Systems   Constitutional: Negative for fever.   HENT: Negative for ear pain and sore throat.    Respiratory: Positive for cough and wheezing. Negative for shortness of breath.         Wheezing lying down which resolved   Cardiovascular: Negative for leg swelling.   Gastrointestinal: Negative for abdominal pain, bowel incontinence, diarrhea and vomiting.   Genitourinary: Negative for bladder incontinence, dysuria and hematuria.   Musculoskeletal: Positive for back pain.   Skin: Negative for rash.       Physical Exam  ED Triage Vitals " [01/26/23 1525]   Temp Heart Rate Resp BP SpO2   36.6 °C (97.8 °F) 93 16 135/88 97 %      Temp src Heart Rate Source Patient Position BP Location FiO2 (%) (Set)   -- -- -- -- --       Physical Exam  Vitals reviewed.   Constitutional:       General: She is not in acute distress.     Appearance: She is not toxic-appearing.      Comments: Frequent intermittent cough   HENT:      Mouth/Throat:      Mouth: Mucous membranes are moist.      Pharynx: No oropharyngeal exudate or posterior oropharyngeal erythema.   Eyes:      General: No scleral icterus.        Right eye: No discharge.         Left eye: No discharge.   Cardiovascular:      Rate and Rhythm: Normal rate and regular rhythm.      Heart sounds: No murmur heard.  Pulmonary:      Effort: Pulmonary effort is normal. No respiratory distress.      Breath sounds: Normal breath sounds. No stridor. No wheezing or rales.   Chest:      Chest wall: No tenderness.   Musculoskeletal:      Cervical back: No tenderness.      Right lower leg: No edema.      Left lower leg: No edema.      Comments: The area of pain in the lower right    Dorsal back is nontender to palpation.   Lymphadenopathy:      Cervical: No cervical adenopathy.   Skin:     General: Skin is warm and dry.      Findings: Erythema present.      Comments: Some redness over the right lower paradorsal back but she did apply Aspercreme to this area earlier.  No vesicles or papules   Neurological:      Mental Status: She is alert.   Psychiatric:         Mood and Affect: Mood normal.         Behavior: Behavior normal.           Procedures  Procedures    UC Course  ED Course as of 01/26/23 1649   Thu Jan 26, 2023   1546 HCG neg [RD]      ED Course User Index  [RD] Suleiman Luo, DO       Medical Decision Making  I do think this is most likely musculoskeletal.  No pneumonia on x-ray.  Urine analysis did show some red blood cells.  Cannot completely rule out a stone based on this but lower on my differential.   Certainly if the patient's pain worsens she should go to the ER or call her doctor for further evaluation and treatment.  I am going to try her on a course of prednisone and muscle relaxers along with topical lidocaine to see if we can calm this down.  Please see disposition for care plan    Amount and/or Complexity of Data Reviewed  External Data Reviewed: radiology.     Details: No acute cardiopulmonary disease on x-ray.                     Suleiman Luo DO  01/26/23 [id number]

## 2023-01-26 NOTE — Clinical Note
Karime Lane was seen and treated in our urgent care on 1/26/2023.  She may return to work on 01/30/2023.  Please excuse from work due to an injury/illness.  Thank you for your consideration in this matter.  Please call with any questions or concerns     If you have any questions or concerns, please don't hesitate to call.      Suleiman Luo, DO

## 2023-01-26 NOTE — DISCHARGE INSTRUCTIONS
In the office chest x-ray showed no evidence of acute cardiopulmonary disease.  I will attach the official x-ray report on your discharge papers for you and your provider.    I do think this is most likely musculoskeletal back pain.  I am putting you on a course of prednisone and muscle relaxers to take as directed and discussed.  Also lidocaine patches to use topically over the affected area as well.    Ice is always acceptable in 15 to 20-minute intervals for pain throughout the day.    If your pain worsens or does not resolve over the next week please call your doctor and be reevaluated.  Certainly if your symptoms worsen I want you to call your doctor sooner or go to the nearest emergency room for further evaluation and treatment.  There was a trace amount of blood on the urine test.  I do not think this is a kidney stone but cannot rule it out with test done in the office completely.

## 2023-03-31 ENCOUNTER — APPOINTMENT (OUTPATIENT)
Dept: URBAN - METROPOLITAN AREA CLINIC 203 | Age: 58
Setting detail: DERMATOLOGY
End: 2023-04-02

## 2023-03-31 DIAGNOSIS — L30.9 DERMATITIS, UNSPECIFIED: ICD-10-CM

## 2023-03-31 PROCEDURE — OTHER PRESCRIPTION MEDICATION MANAGEMENT: OTHER

## 2023-03-31 PROCEDURE — 99203 OFFICE O/P NEW LOW 30 MIN: CPT

## 2023-03-31 PROCEDURE — OTHER PRESCRIPTION: OTHER

## 2023-03-31 RX ORDER — METHYLPREDNISOLONE 4 MG/1
TABLET ORAL
Qty: 1 | Refills: 0 | Status: ERX | COMMUNITY
Start: 2023-03-31

## 2023-03-31 ASSESSMENT — LOCATION DETAILED DESCRIPTION DERM
LOCATION DETAILED: LEFT MEDIAL SUPERIOR CHEST
LOCATION DETAILED: RIGHT MEDIAL SUPERIOR CHEST

## 2023-03-31 ASSESSMENT — LOCATION SIMPLE DESCRIPTION DERM: LOCATION SIMPLE: CHEST

## 2023-03-31 ASSESSMENT — LOCATION ZONE DERM: LOCATION ZONE: TRUNK

## 2023-03-31 NOTE — PROCEDURE: PRESCRIPTION MEDICATION MANAGEMENT
Initiate Treatment: Compound sent to [facility]: gabapentin/Amitriptyline/lidocaine cream \\n\\nmedrol dose pack
Render In Strict Bullet Format?: No
Detail Level: Zone
Plan: Discussed gabapentin.\\nIf not improved will consider a biopsy and labs. \\n\\Heike, anti Mi-2, CK,Aldolase, AST, LH

## 2023-05-18 ENCOUNTER — APPOINTMENT (OUTPATIENT)
Dept: URBAN - METROPOLITAN AREA CLINIC 203 | Age: 58
Setting detail: DERMATOLOGY
End: 2023-05-22

## 2023-05-18 DIAGNOSIS — R23.2 FLUSHING: ICD-10-CM

## 2023-05-18 DIAGNOSIS — R21 RASH AND OTHER NONSPECIFIC SKIN ERUPTION: ICD-10-CM

## 2023-05-18 PROCEDURE — OTHER ORDER TESTS: OTHER

## 2023-05-18 PROCEDURE — OTHER DIAGNOSIS COMMENT: OTHER

## 2023-05-18 PROCEDURE — 99212 OFFICE O/P EST SF 10 MIN: CPT

## 2023-05-18 PROCEDURE — OTHER PRESCRIPTION MEDICATION MANAGEMENT: OTHER

## 2023-05-18 PROCEDURE — OTHER COUNSELING: OTHER

## 2023-05-18 ASSESSMENT — LOCATION ZONE DERM
LOCATION ZONE: TRUNK
LOCATION ZONE: TRUNK

## 2023-05-18 ASSESSMENT — LOCATION SIMPLE DESCRIPTION DERM
LOCATION SIMPLE: CHEST
LOCATION SIMPLE: CHEST

## 2023-05-18 ASSESSMENT — LOCATION DETAILED DESCRIPTION DERM
LOCATION DETAILED: LEFT MEDIAL SUPERIOR CHEST
LOCATION DETAILED: LEFT MEDIAL SUPERIOR CHEST

## 2023-05-18 NOTE — PROCEDURE: PRESCRIPTION MEDICATION MANAGEMENT
Render In Strict Bullet Format?: No
Plan: Pt to contact PCP to manage HBP. Possibly change beta blocker
Initiate Treatment: [facility] compound: nifedipine/niacinamide cream
Detail Level: Zone

## 2023-05-18 NOTE — PROCEDURE: ORDER TESTS
Expected Date Of Service: 05/18/2023
Billing Type: Third-Party Bill
Bill For Surgical Tray: no
Performing Laboratory: [id number]

## 2023-07-03 ENCOUNTER — APPOINTMENT (OUTPATIENT)
Dept: URBAN - METROPOLITAN AREA CLINIC 200 | Age: 58
Setting detail: DERMATOLOGY
End: 2023-07-03

## 2023-07-03 DIAGNOSIS — R21 RASH AND OTHER NONSPECIFIC SKIN ERUPTION: ICD-10-CM

## 2023-07-03 PROCEDURE — OTHER PRESCRIPTION: OTHER

## 2023-07-03 RX ORDER — DOXYCYCLINE HYCLATE 20 MG/1
TABLET, FILM COATED ORAL
Qty: 60 | Refills: 2 | Status: ERX | COMMUNITY
Start: 2023-07-03

## 2023-07-03 RX ORDER — TACROLIMUS 0.3 MG/G
OINTMENT TOPICAL
Qty: 30 | Refills: 1 | Status: ERX | COMMUNITY
Start: 2023-07-03

## 2023-07-03 ASSESSMENT — LOCATION DETAILED DESCRIPTION DERM
LOCATION DETAILED: UPPER STERNUM
LOCATION DETAILED: RIGHT LATERAL SUPERIOR CHEST

## 2023-07-03 ASSESSMENT — LOCATION SIMPLE DESCRIPTION DERM: LOCATION SIMPLE: CHEST

## 2023-07-03 ASSESSMENT — LOCATION ZONE DERM: LOCATION ZONE: TRUNK

## 2023-07-06 ENCOUNTER — HOSPITAL ENCOUNTER (OUTPATIENT)
Facility: CLINIC | Age: 58
Discharge: HOME | End: 2023-07-06
Attending: FAMILY MEDICINE
Payer: COMMERCIAL

## 2023-07-06 VITALS
TEMPERATURE: 98.4 F | DIASTOLIC BLOOD PRESSURE: 84 MMHG | SYSTOLIC BLOOD PRESSURE: 137 MMHG | OXYGEN SATURATION: 98 % | HEART RATE: 77 BPM

## 2023-07-06 DIAGNOSIS — R21 RASH: Primary | ICD-10-CM

## 2023-07-06 PROCEDURE — 99213 OFFICE O/P EST LOW 20 MIN: CPT | Performed by: NURSE PRACTITIONER

## 2023-07-06 RX ORDER — ATENOLOL 25 MG/1
25 TABLET ORAL DAILY
COMMUNITY
Start: 2023-05-19

## 2023-07-06 ASSESSMENT — ENCOUNTER SYMPTOMS
HEMATURIA: 0
COLOR CHANGE: 1
SHORTNESS OF BREATH: 0
EYE PAIN: 0
CHILLS: 0
FEVER: 0
DYSURIA: 0
VOMITING: 0
SEIZURES: 0
PALPITATIONS: 0
BACK PAIN: 0
COUGH: 0
ARTHRALGIAS: 0
ABDOMINAL PAIN: 0
SORE THROAT: 0

## 2023-07-06 NOTE — ED PROVIDER NOTES
"Emergency Medicine Note  HPI   HISTORY OF PRESENT ILLNESS     Red, itchy, warm skin on chest and neck x 1 year but worse in the past month.   \"Feels like someone is jabbing me with sharp needles.\"  Following with Dermatology and PCP.   Recently changed Metoprolol to Atenolol with no improvement.  Tried Tacrolimus ointment, Doxycycline, Claritin, cream for rosecia, triamcinalone, clobestasol, mometasone, cortisone, Benadryl with no relief.   Denies fevers, chest pain or shortness of breath.                 Patient History   PAST HISTORY     Reviewed from Nursing Triage:       Past Medical History:   Diagnosis Date   • Hypertension    • Lipid disorder    • Type 2 diabetes mellitus (CMS/[facility])        Past Surgical History:   Procedure Laterality Date   •  SECTION         No family history on file.    Social History     Tobacco Use   • Smoking status: Never   • Smokeless tobacco: Never   Substance Use Topics   • Alcohol use: Not Currently   • Drug use: Never         Review of Systems   REVIEW OF SYSTEMS     Review of Systems   Constitutional: Negative for chills and fever.   HENT: Negative for ear pain and sore throat.    Eyes: Negative for pain and visual disturbance.   Respiratory: Negative for cough and shortness of breath.    Cardiovascular: Negative for chest pain and palpitations.   Gastrointestinal: Negative for abdominal pain and vomiting.   Genitourinary: Negative for dysuria and hematuria.   Musculoskeletal: Negative for arthralgias and back pain.   Skin: Positive for color change and rash.   Neurological: Negative for seizures and syncope.   All other systems reviewed and are negative.        VITALS     ED Vitals    Date/Time Temp Pulse Resp BP SpO2 [facility]   23 0931 36.9 °C (98.4 °F) 77 -- 137/84 98 % CMA                       Physical Exam   PHYSICAL EXAM     Physical Exam  Vitals and nursing note reviewed.   Constitutional:       General: She is not in acute distress.     Appearance: She is " well-developed. She is not ill-appearing, toxic-appearing or diaphoretic.   HENT:      Head: Normocephalic and atraumatic.      Nose: No congestion or rhinorrhea.      Mouth/Throat:      Mouth: Mucous membranes are moist.   Eyes:      Extraocular Movements: Extraocular movements intact.      Conjunctiva/sclera: Conjunctivae normal.      Pupils: Pupils are equal, round, and reactive to light.   Cardiovascular:      Rate and Rhythm: Normal rate and regular rhythm.      Heart sounds: Normal heart sounds.   Pulmonary:      Effort: Pulmonary effort is normal. No respiratory distress.      Breath sounds: Normal breath sounds. No stridor. No wheezing, rhonchi or rales.   Chest:      Chest wall: No tenderness.   Abdominal:      Palpations: Abdomen is soft.      Tenderness: There is no abdominal tenderness.   Musculoskeletal:         General: No tenderness.      Right lower leg: No edema.      Left lower leg: No edema.   Skin:     General: Skin is warm and dry.      Capillary Refill: Capillary refill takes less than 2 seconds.      Findings: Erythema present.      Comments: Erythematous chest and neck   Neurological:      Mental Status: She is alert and oriented to person, place, and time.           PROCEDURES     Procedures     DATA     Results     None              No orders to display       Scoring tools                                  ED Course & MDM   MDM / ED COURSE / CLINICAL IMPRESSION / DISPO     Medical Decision Making  Recommended Zytrec BID and pepcid 20 mg daily.   If no improvement follow up with Allergy/Immunology.   Given a list of [facility] Allergy/Immunology providers.   Instructed to follow up with PCP or ED for new or worsening symptoms.              Clinical Impression      None               Katheryn Waller CRNP  07/06/23 1013

## 2023-07-06 NOTE — DISCHARGE INSTRUCTIONS
Try Zyrtec twice daily and Pepcid 20 mg daily.   If no improvement follow up with Allergy Immunology, see attached list.

## 2023-08-17 ENCOUNTER — APPOINTMENT (OUTPATIENT)
Dept: URBAN - METROPOLITAN AREA CLINIC 203 | Age: 58
Setting detail: DERMATOLOGY
End: 2023-08-30

## 2023-08-17 DIAGNOSIS — L30.9 DERMATITIS, UNSPECIFIED: ICD-10-CM

## 2023-08-17 PROCEDURE — OTHER PRESCRIPTION: OTHER

## 2023-08-17 PROCEDURE — OTHER PRESCRIPTION MEDICATION MANAGEMENT: OTHER

## 2023-08-17 PROCEDURE — 99212 OFFICE O/P EST SF 10 MIN: CPT

## 2023-08-17 PROCEDURE — OTHER ADDITIONAL NOTES: OTHER

## 2023-08-17 RX ORDER — HYDROXYCHLOROQUINE SULFATE 200 MG/1
TABLET ORAL
Qty: 180 | Refills: 0 | Status: ERX | COMMUNITY
Start: 2023-08-17

## 2023-08-17 RX ORDER — TRIAMCINOLONE ACETONIDE 1 MG/G
CREAM TOPICAL BID
Qty: 80 | Refills: 1 | Status: ERX | COMMUNITY
Start: 2023-08-17

## 2023-08-17 ASSESSMENT — LOCATION ZONE DERM: LOCATION ZONE: TRUNK

## 2023-08-17 ASSESSMENT — LOCATION SIMPLE DESCRIPTION DERM: LOCATION SIMPLE: CHEST

## 2023-08-17 ASSESSMENT — LOCATION DETAILED DESCRIPTION DERM
LOCATION DETAILED: UPPER STERNUM
LOCATION DETAILED: LEFT LATERAL SUPERIOR CHEST

## 2023-08-17 NOTE — PROCEDURE: ADDITIONAL NOTES
Detail Level: Simple
Additional Notes: Pt denies muscle weakness
Render Risk Assessment In Note?: no

## 2023-08-17 NOTE — PROCEDURE: PRESCRIPTION MEDICATION MANAGEMENT
Detail Level: Zone
Render In Strict Bullet Format?: No
Initiate Treatment: Plaquenil 200mg BID\\n\\n**Pt informed she will need eye exams. Patient states she has regular eye exams for prescriptions glasses

## 2023-09-18 ENCOUNTER — APPOINTMENT (OUTPATIENT)
Dept: URBAN - METROPOLITAN AREA CLINIC 203 | Age: 58
Setting detail: DERMATOLOGY
End: 2023-09-22

## 2023-09-18 DIAGNOSIS — R21 RASH AND OTHER NONSPECIFIC SKIN ERUPTION: ICD-10-CM

## 2023-09-18 DIAGNOSIS — R23.2 FLUSHING: ICD-10-CM

## 2023-09-18 PROCEDURE — 99212 OFFICE O/P EST SF 10 MIN: CPT

## 2023-09-18 PROCEDURE — OTHER ORDER TESTS: OTHER

## 2023-09-18 PROCEDURE — OTHER PRESCRIPTION MEDICATION MANAGEMENT: OTHER

## 2023-09-18 ASSESSMENT — LOCATION DETAILED DESCRIPTION DERM: LOCATION DETAILED: LEFT PROXIMAL POSTERIOR UPPER ARM

## 2023-09-18 ASSESSMENT — LOCATION SIMPLE DESCRIPTION DERM: LOCATION SIMPLE: LEFT POSTERIOR UPPER ARM

## 2023-09-18 ASSESSMENT — LOCATION ZONE DERM: LOCATION ZONE: ARM

## 2023-09-18 NOTE — PROCEDURE: ORDER TESTS
Billing Type: Third-Party Bill
Performing Laboratory: [id number]
Expected Date Of Service: 09/18/2023
Bill For Surgical Tray: no

## 2023-09-18 NOTE — PROCEDURE: PRESCRIPTION MEDICATION MANAGEMENT
Plan: New allergies to wheat, milk, egg white and peanut.\\n\\nPt was recommended to start a food log.
Detail Level: Zone
Render In Strict Bullet Format?: No

## 2023-10-02 ENCOUNTER — OFFICE VISIT (OUTPATIENT)
Dept: OBSTETRICS AND GYNECOLOGY | Facility: CLINIC | Age: 58
End: 2023-10-02
Payer: COMMERCIAL

## 2023-10-02 VITALS
BODY MASS INDEX: 38.38 KG/M2 | WEIGHT: 238.8 LBS | DIASTOLIC BLOOD PRESSURE: 70 MMHG | HEIGHT: 66 IN | SYSTOLIC BLOOD PRESSURE: 128 MMHG

## 2023-10-02 DIAGNOSIS — Z12.31 ENCOUNTER FOR SCREENING MAMMOGRAM FOR MALIGNANT NEOPLASM OF BREAST: ICD-10-CM

## 2023-10-02 DIAGNOSIS — Z01.419 WOMEN'S ANNUAL ROUTINE GYNECOLOGICAL EXAMINATION: Primary | ICD-10-CM

## 2023-10-02 PROCEDURE — 99396 PREV VISIT EST AGE 40-64: CPT | Performed by: OBSTETRICS & GYNECOLOGY

## 2023-10-02 PROCEDURE — 3008F BODY MASS INDEX DOCD: CPT | Performed by: OBSTETRICS & GYNECOLOGY

## 2023-10-02 RX ORDER — VALACYCLOVIR HYDROCHLORIDE 1 G/1
TABLET, FILM COATED ORAL
COMMUNITY
Start: 2023-07-06

## 2023-10-02 RX ORDER — GABAPENTIN 300 MG/1
300 CAPSULE ORAL 2 TIMES DAILY
COMMUNITY
Start: 2023-08-06

## 2023-10-02 RX ORDER — SERTRALINE HYDROCHLORIDE 50 MG/1
50 TABLET, FILM COATED ORAL DAILY
COMMUNITY
Start: 2023-09-01 | End: 2024-10-02 | Stop reason: ALTCHOICE

## 2023-10-02 ASSESSMENT — ENCOUNTER SYMPTOMS
CONSTITUTIONAL NEGATIVE: 1
HEMATOLOGIC/LYMPHATIC NEGATIVE: 1
EYE REDNESS: 0
NERVOUS/ANXIOUS: 0
GASTROINTESTINAL NEGATIVE: 1
SORE THROAT: 0
LIGHT-HEADEDNESS: 0
SINUS PRESSURE: 0
FLANK PAIN: 0
HEADACHES: 0
ALLERGIC/IMMUNOLOGIC NEGATIVE: 1
ENDOCRINE NEGATIVE: 1
DEPRESSION: 0
WEAKNESS: 0
HEMATURIA: 0
BACK PAIN: 0
MYALGIAS: 0
TROUBLE SWALLOWING: 0
CHEST TIGHTNESS: 0
SHORTNESS OF BREATH: 0
WHEEZING: 0
JOINT SWELLING: 0
FREQUENCY: 0
SLEEP DISTURBANCE: 0
PALPITATIONS: 0
DIFFICULTY URINATING: 0
DECREASED CONCENTRATION: 0
DYSURIA: 0
PHOTOPHOBIA: 0
COUGH: 0

## 2023-10-02 NOTE — PROGRESS NOTES
"10/2/2023  Karime Lane is a 58 y.o. female who presents for annual exam.   Spouse  ; lupus/MI  Having skin issues/due to  Stress of his loss  Started zoloft andzyrtec    Post menopausal ; no bleeding, no HRT  The patient is not currently sexually active. GYN screening history: last pap: was normal. Domestic violence: no.       History of renal stones  August had stone removed and stent placed; stent removed 2 wks ago; doing well      Current contraception: post menopausal status  History of abnormal Pap smear: no  Family history of uterine or ovarian cancer: no  Regular self breast exam: no  History of abnormal mammogram: no  Family history of breast cancer: yes - sister and paternal Aunt  History of abnormal lipids: yes - on statin therapy  Menstrual History:  OB History        3    Para   3    Term   2       1    AB   0    Living   2       SAB        IAB        Ectopic        Multiple        Live Births   2                No LMP recorded. Patient is postmenopausal.         Review of Systems and Physical Exam  The following have been reviewed and updated as appropriate in this visit:       Visit Vitals  /70 (BP Location: Right upper arm, Patient Position: Sitting)   Ht 1.676 m (5' 6\")   Wt 108 kg (238 lb 12.8 oz)   BMI 38.54 kg/m²     HPI  Review of Systems   Constitutional: Negative.    HENT: Negative for sinus pressure, sneezing, sore throat and trouble swallowing.    Eyes: Negative for photophobia, redness and visual disturbance.   Respiratory: Negative for cough, chest tightness, shortness of breath and wheezing.    Cardiovascular: Negative for chest pain, palpitations and leg swelling.   Gastrointestinal: Negative.    Endocrine: Negative.    Genitourinary: Negative for decreased urine volume, difficulty urinating, dyspareunia, dysuria, flank pain, frequency, genital sores, hematuria, irregular menses, nocturia, pain with intercourse, pelvic pain, sexual dysfunction, urgency, " vaginal bleeding, vaginal discharge, vaginal itching and vaginal pain.   Breast: Negative.   Musculoskeletal: Negative for back pain, joint swelling and myalgias.   Skin: Negative.    Allergic/Immunologic: Negative.    Neurological: Negative for weakness, light-headedness and headaches.   Hematological: Negative.    Psychiatric/Behavioral: Negative for decreased concentration, depression, sleep disturbance and suicidal ideas. The patient is not nervous/anxious.        Physical Exam  Constitutional:       General: She is not in acute distress.     Appearance: Normal appearance. She is well-developed. She is not diaphoretic.   HENT:      Head: Normocephalic and atraumatic.      Nose: Nose normal.     Eyes:      General: No scleral icterus.        Right eye: No discharge.         Left eye: No discharge.      Conjunctiva/sclera: Conjunctivae normal.      Pupils: Pupils are equal, round, and reactive to light.   Genitourinary:    Urethra, bladder, vagina, cervix, uterus, urethral meatus, external female genitalia and adnexa normal.   Pelvic exam was performed with patient in lithotomy exam position.      No labial rash.   No signs of erythema or atrophy. There is no right labia majora lesion or labia minora lesion. There is no left labia majora lesion or labia minora lesion. There is no lesion, tenderness, injury or Bartholin's cyst on the right external genitalia. There is no lesion, tenderness, injury or no Bartholin's cyst on the left external genitalia. There is no urethral meatus prolapse, lesion or bleeding. There is no urethral discharge, erythema, tenderness, urethrocele or urethral diverticulum. The urethra is not everted. Trezevant's glands normal.    No vaginal discharge.   Uterine contour is regular. Uterus is anteverted.   Neck:      Thyroid: No thyromegaly.   Cardiovascular:      Rate and Rhythm: Normal rate and regular rhythm.   Pulmonary:      Effort: Pulmonary effort is normal.   Chest:   Breasts:      Breasts are symmetrical.      Right: Normal. No inverted nipple, mass, nipple discharge, skin change or tenderness.      Left: Normal. No inverted nipple, mass, nipple discharge, skin change or tenderness.   Abdominal:      General: Abdomen is flat. There is no distension.      Palpations: Abdomen is soft. There is no mass.      Tenderness: There is no abdominal tenderness. There is no right CVA tenderness, left CVA tenderness, guarding or rebound.      Hernia: No hernia is present.   Musculoskeletal:         General: No tenderness or deformity. Normal range of motion.      Cervical back: Normal range of motion and neck supple.   Lymphadenopathy:      Cervical: No cervical adenopathy.      Upper Body:      Right upper body: No supraclavicular, axillary or pectoral adenopathy.      Left upper body: No supraclavicular, axillary or pectoral adenopathy.   Neurological:      Mental Status: She is alert and oriented to person, place, and time.      Cranial Nerves: No cranial nerve deficit.   Skin:     General: Skin is warm and dry.      Coloration: Skin is not pale.      Findings: No erythema or rash.   Psychiatric:         Behavior: Behavior normal.   Vitals reviewed.          Diagnoses and all orders for this visit:    Women's annual routine gynecological examination    Encounter for screening mammogram for malignant neoplasm of breast  -     BI SCREENING MAMMOGRAM BILATERAL(TOMOSYNTHESIS); Future    .pap normal 7/22  Colonoscopy; working on scheduling this  Breast self exam technique reviewed and patient encouraged to perform self-exam monthly..  No follow-ups on file.  Kitty Vo MD

## 2023-11-06 ENCOUNTER — APPOINTMENT (OUTPATIENT)
Dept: URBAN - METROPOLITAN AREA CLINIC 203 | Age: 58
Setting detail: DERMATOLOGY
End: 2023-11-08

## 2023-11-06 DIAGNOSIS — L30.9 DERMATITIS, UNSPECIFIED: ICD-10-CM

## 2023-11-06 PROCEDURE — OTHER PRESCRIPTION MEDICATION MANAGEMENT: OTHER

## 2023-11-06 PROCEDURE — OTHER PRESCRIPTION: OTHER

## 2023-11-06 PROCEDURE — 99212 OFFICE O/P EST SF 10 MIN: CPT

## 2023-11-06 RX ORDER — TRIAMCINOLONE ACETONIDE 1 MG/G
CREAM TOPICAL BID
Qty: 80 | Refills: 2 | Status: ERX

## 2024-03-25 ENCOUNTER — APPOINTMENT (OUTPATIENT)
Dept: URBAN - METROPOLITAN AREA CLINIC 203 | Age: 59
Setting detail: DERMATOLOGY
End: 2024-03-28

## 2024-03-25 DIAGNOSIS — Z71.89 OTHER SPECIFIED COUNSELING: ICD-10-CM

## 2024-03-25 DIAGNOSIS — L82.1 OTHER SEBORRHEIC KERATOSIS: ICD-10-CM

## 2024-03-25 DIAGNOSIS — D22 MELANOCYTIC NEVI: ICD-10-CM

## 2024-03-25 DIAGNOSIS — L81.4 OTHER MELANIN HYPERPIGMENTATION: ICD-10-CM

## 2024-03-25 DIAGNOSIS — D18.0 HEMANGIOMA: ICD-10-CM

## 2024-03-25 DIAGNOSIS — L57.8 OTHER SKIN CHANGES DUE TO CHRONIC EXPOSURE TO NONIONIZING RADIATION: ICD-10-CM

## 2024-03-25 PROBLEM — D18.01 HEMANGIOMA OF SKIN AND SUBCUTANEOUS TISSUE: Status: ACTIVE | Noted: 2024-03-25

## 2024-03-25 PROBLEM — D22.5 MELANOCYTIC NEVI OF TRUNK: Status: ACTIVE | Noted: 2024-03-25

## 2024-03-25 PROCEDURE — OTHER COUNSELING: OTHER

## 2024-03-25 PROCEDURE — 99213 OFFICE O/P EST LOW 20 MIN: CPT

## 2024-03-25 PROCEDURE — OTHER SUNSCREEN RECOMMENDATIONS: OTHER

## 2024-03-25 PROCEDURE — OTHER REASSURANCE: OTHER

## 2024-03-25 ASSESSMENT — LOCATION SIMPLE DESCRIPTION DERM
LOCATION SIMPLE: LEFT BREAST
LOCATION SIMPLE: LEFT UPPER BACK
LOCATION SIMPLE: LEFT FOREHEAD
LOCATION SIMPLE: RIGHT POPLITEAL SKIN
LOCATION SIMPLE: ABDOMEN

## 2024-03-25 ASSESSMENT — LOCATION DETAILED DESCRIPTION DERM
LOCATION DETAILED: RIGHT POPLITEAL SKIN
LOCATION DETAILED: LEFT SUPERIOR UPPER BACK
LOCATION DETAILED: LEFT MEDIAL BREAST 10-11:00 REGION
LOCATION DETAILED: EPIGASTRIC SKIN
LOCATION DETAILED: LEFT MEDIAL BREAST 11-12:00 REGION
LOCATION DETAILED: LEFT INFERIOR MEDIAL FOREHEAD

## 2024-03-25 ASSESSMENT — LOCATION ZONE DERM
LOCATION ZONE: LEG
LOCATION ZONE: FACE
LOCATION ZONE: TRUNK

## 2024-03-25 NOTE — PROCEDURE: COUNSELING
OT arvind completed. Recommends SNF.  CGA for bed mobility and ambulation with RW. Min A for sit>stand with RW.  
Detail Level: Generalized
Detail Level: Detailed

## 2024-10-02 ENCOUNTER — HOSPITAL ENCOUNTER (OUTPATIENT)
Dept: RADIOLOGY | Age: 59
End: 2024-10-02
Attending: FAMILY MEDICINE
Payer: COMMERCIAL

## 2024-10-02 ENCOUNTER — HOSPITAL ENCOUNTER (OUTPATIENT)
Facility: CLINIC | Age: 59
Discharge: HOME | End: 2024-10-02
Attending: FAMILY MEDICINE
Payer: COMMERCIAL

## 2024-10-02 VITALS
SYSTOLIC BLOOD PRESSURE: 144 MMHG | OXYGEN SATURATION: 98 % | HEART RATE: 96 BPM | DIASTOLIC BLOOD PRESSURE: 82 MMHG | TEMPERATURE: 97.5 F

## 2024-10-02 DIAGNOSIS — M79.604 LEG PAIN, LATERAL, RIGHT: Primary | ICD-10-CM

## 2024-10-02 PROCEDURE — 99213 OFFICE O/P EST LOW 20 MIN: CPT | Performed by: FAMILY MEDICINE

## 2024-10-02 RX ORDER — ORAL SEMAGLUTIDE 3 MG/1
3 TABLET ORAL
COMMUNITY
Start: 2024-09-26 | End: 2024-10-26

## 2024-10-02 RX ORDER — CLONAZEPAM 0.5 MG/1
TABLET ORAL
COMMUNITY
Start: 2024-08-26

## 2024-10-02 RX ORDER — SERTRALINE HYDROCHLORIDE 100 MG/1
TABLET, FILM COATED ORAL
COMMUNITY
Start: 2024-08-11

## 2024-10-02 RX ORDER — SERTRALINE HYDROCHLORIDE 25 MG/1
TABLET, FILM COATED ORAL
COMMUNITY
Start: 2024-09-25

## 2024-10-02 RX ORDER — LISINOPRIL 5 MG/1
5 TABLET ORAL DAILY
COMMUNITY

## 2024-10-02 ASSESSMENT — ENCOUNTER SYMPTOMS
CHILLS: 0
WOUND: 0
FEVER: 0
LEG PAIN: 1
ARTHRALGIAS: 1
FATIGUE: 0

## 2024-10-02 NOTE — ED PROVIDER NOTES
History  Chief Complaint   Patient presents with    Leg Pain     Pt states- Right leg pain for well over a month   Saw PCP last week states that thought she tore the hamstring- but didn't give pt any thing to brace it.  Pt braces leg herself. Can't sleep because of pain   No xrays or anything where done at appt last week.      Karime is a 60 yo female with a past history of HTN, DM-II, HLD presenting with right lateral knee pain for  over a month.    Initially beginning as hamstring tightness, now she is having right lateral knee pain, made worse after dancing at her son's wedding in a pair of heels.       History provided by:  Patient   used: No    Leg Pain  Associated symptoms: no fatigue and no fever        Past Medical History:   Diagnosis Date    Hypertension     Lipid disorder     Type 2 diabetes mellitus (CMS/HCC)        Past Surgical History   Procedure Laterality Date     section         No family history on file.    Social History     Tobacco Use    Smoking status: Never    Smokeless tobacco: Never   Vaping Use    Vaping status: Never Used   Substance Use Topics    Alcohol use: Not Currently    Drug use: Never       Review of Systems   Constitutional:  Negative for chills, fatigue and fever.   Musculoskeletal:  Positive for arthralgias and gait problem.   Skin:  Negative for wound.       Physical Exam  ED Triage Vitals [10/02/24 1549]   Temp Heart Rate Resp BP SpO2   36.4 °C (97.5 °F) 96 -- (!) 144/82 98 %      Temp src Heart Rate Source Patient Position BP Location FiO2 (%) (Set)   -- -- Sitting -- --       Physical Exam  Constitutional:       General: She is in acute distress.      Appearance: Normal appearance.   Musculoskeletal:         General: Tenderness present. No swelling or signs of injury.      Right lower leg: No edema.      Left lower leg: No edema.        Legs:       Comments: B/l hamstring tightness.   Posteriorly displaced proximal fibular head.    Skin:      General: Skin is warm.   Neurological:      General: No focal deficit present.      Mental Status: She is alert and oriented to person, place, and time.   Psychiatric:         Mood and Affect: Mood normal.         Behavior: Behavior normal.           Procedures  Procedures    UC Course       Medical Decision Making  Fibular head dysfunction, likely secondary to hamstring tightness and dancing at her son's wedding in heels.   Recommending RICE & NSAIDs   Recommending Chiropractics, Orthopedic specialist if symptoms worsen.                        Jeremiah Nielson DO  10/02/24 4771

## 2024-10-02 NOTE — DISCHARGE INSTRUCTIONS
Ice the injured area to help reduce pain and swelling. Wrap a cold source (recommending a bag of frozen peas or frozen corn) in a thin towel. Apply to the injured area for 20 minutes every 1 to 2 hours the first day. Continue this 3 to 4 times a day until the pain and swelling goes away.     Remember,  Ice is Nice, that's why they rhyme, and you can use it ALL the time : )     __________________________________________________________    **Avoid heat if possible. If anything, apply moist heat (ie- shower, warm wet washcloth compresses), this is preferred over dry heat (ie-heating pad).** Moist heat is recommended prior to activity/strengthening/stretching. Ice immediately after .    You may alternate Ibuprofen with Tylenol every 3-4 hours. (Ex: Ibuprofen at 8am, Tylenol at 11am, Ibuprofen at 2pm, etc) as needed.   Ibuprofen 600mg every 6hr (always with food)   Tylenol 500mg-650mg doses every 6hrs     __________________________________________________________    Consider following up at [facility] ([facility]) for further evaluation and treatment. They are able to offer an invaluable treatment known as OMT (Osteopathic Manipulative Treatment) that may help you feel much better! (Phone: [phone])   __________________________________________________________    Please let us know about your experience today! You can do this by going online to Google, Yelp.   Your input is truly appreciated and helps Dr. Nielson and your team at [facility] to provide a superior level of service!   Get Well Soon!

## 2025-03-27 ENCOUNTER — APPOINTMENT (OUTPATIENT)
Dept: URBAN - METROPOLITAN AREA CLINIC 203 | Age: 60
Setting detail: DERMATOLOGY
End: 2025-03-27

## 2025-03-27 DIAGNOSIS — Z71.89 OTHER SPECIFIED COUNSELING: ICD-10-CM

## 2025-03-27 DIAGNOSIS — D18.0 HEMANGIOMA: ICD-10-CM

## 2025-03-27 DIAGNOSIS — L81.4 OTHER MELANIN HYPERPIGMENTATION: ICD-10-CM

## 2025-03-27 DIAGNOSIS — L57.8 OTHER SKIN CHANGES DUE TO CHRONIC EXPOSURE TO NONIONIZING RADIATION: ICD-10-CM

## 2025-03-27 DIAGNOSIS — D22 MELANOCYTIC NEVI: ICD-10-CM

## 2025-03-27 DIAGNOSIS — L82.0 INFLAMED SEBORRHEIC KERATOSIS: ICD-10-CM

## 2025-03-27 PROBLEM — D22.5 MELANOCYTIC NEVI OF TRUNK: Status: ACTIVE | Noted: 2025-03-27

## 2025-03-27 PROBLEM — D18.01 HEMANGIOMA OF SKIN AND SUBCUTANEOUS TISSUE: Status: ACTIVE | Noted: 2025-03-27

## 2025-03-27 PROCEDURE — OTHER COUNSELING: OTHER

## 2025-03-27 PROCEDURE — 99213 OFFICE O/P EST LOW 20 MIN: CPT | Mod: 25

## 2025-03-27 PROCEDURE — 17110 DESTRUCT B9 LESION 1-14: CPT

## 2025-03-27 PROCEDURE — OTHER REASSURANCE: OTHER

## 2025-03-27 PROCEDURE — OTHER SUNSCREEN RECOMMENDATIONS: OTHER

## 2025-03-27 PROCEDURE — OTHER LIQUID NITROGEN: OTHER

## 2025-03-27 ASSESSMENT — LOCATION ZONE DERM
LOCATION ZONE: TRUNK
LOCATION ZONE: LEG
LOCATION ZONE: FACE

## 2025-03-27 ASSESSMENT — LOCATION SIMPLE DESCRIPTION DERM
LOCATION SIMPLE: ABDOMEN
LOCATION SIMPLE: RIGHT POPLITEAL SKIN
LOCATION SIMPLE: LEFT FOREHEAD
LOCATION SIMPLE: LEFT UPPER BACK
LOCATION SIMPLE: LEFT BREAST

## 2025-03-27 ASSESSMENT — LOCATION DETAILED DESCRIPTION DERM
LOCATION DETAILED: EPIGASTRIC SKIN
LOCATION DETAILED: LEFT SUPERIOR UPPER BACK
LOCATION DETAILED: LEFT INFERIOR MEDIAL FOREHEAD
LOCATION DETAILED: RIGHT POPLITEAL SKIN
LOCATION DETAILED: LEFT MEDIAL BREAST 11-12:00 REGION
LOCATION DETAILED: LEFT MEDIAL BREAST 10-11:00 REGION

## 2025-03-27 NOTE — PROCEDURE: LIQUID NITROGEN
Show Aperture Variable?: Yes
Number Of Freeze-Thaw Cycles: 3 freeze-thaw cycles
Spray Paint Technique: No
Medical Necessity Clause: This procedure was medically necessary because the lesions that were treated were:
Spray Paint Text: The liquid nitrogen was applied to the skin utilizing a spray paint frosting technique.
Pared With?: 15 blade
Medical Necessity Information: It is in your best interest to select a reason for this procedure from the list below. All of these items fulfill various CMS LCD requirements except the new and changing color options.
Consent: The patient's consent was obtained including but not limited to risks of crusting, scabbing, blistering, scarring, darker or lighter pigmentary change, recurrence, incomplete removal and infection.
Detail Level: Detailed
Post-Care Instructions: I reviewed with the patient in detail post-care instructions. Patient is to wear sunprotection, and avoid picking at any of the treated lesions. Pt may apply Vaseline to crusted or scabbing areas.